# Patient Record
Sex: FEMALE | Race: WHITE | NOT HISPANIC OR LATINO | Employment: PART TIME | URBAN - METROPOLITAN AREA
[De-identification: names, ages, dates, MRNs, and addresses within clinical notes are randomized per-mention and may not be internally consistent; named-entity substitution may affect disease eponyms.]

---

## 2017-02-10 RX ORDER — CITALOPRAM 40 MG/1
40 TABLET, FILM COATED ORAL DAILY
Qty: 30 TABLET | Refills: 3 | Status: SHIPPED | OUTPATIENT
Start: 2017-02-10 | End: 2017-06-14 | Stop reason: SDUPTHER

## 2017-06-14 RX ORDER — CITALOPRAM 40 MG/1
40 TABLET, FILM COATED ORAL DAILY
Qty: 30 TABLET | Refills: 3 | Status: SHIPPED | OUTPATIENT
Start: 2017-06-14 | End: 2017-10-13 | Stop reason: SDUPTHER

## 2017-10-13 RX ORDER — CITALOPRAM 40 MG/1
40 TABLET, FILM COATED ORAL DAILY
Qty: 30 TABLET | Refills: 0 | Status: SHIPPED | OUTPATIENT
Start: 2017-10-13 | End: 2017-10-25 | Stop reason: SDUPTHER

## 2017-10-24 ENCOUNTER — DOCUMENTATION ONLY (OUTPATIENT)
Dept: FAMILY MEDICINE | Facility: CLINIC | Age: 57
End: 2017-10-24

## 2017-10-24 NOTE — PROGRESS NOTES
Pre-Visit Chart Review  For Appointment Scheduled on 10/25/17.    Health Maintenance Due   Topic Date Due    Mammogram  05/15/2017

## 2017-10-25 ENCOUNTER — OFFICE VISIT (OUTPATIENT)
Dept: FAMILY MEDICINE | Facility: CLINIC | Age: 57
End: 2017-10-25
Payer: COMMERCIAL

## 2017-10-25 VITALS
DIASTOLIC BLOOD PRESSURE: 83 MMHG | HEIGHT: 63 IN | TEMPERATURE: 98 F | HEART RATE: 75 BPM | WEIGHT: 142.44 LBS | SYSTOLIC BLOOD PRESSURE: 130 MMHG | BODY MASS INDEX: 25.24 KG/M2

## 2017-10-25 DIAGNOSIS — Z23 IMMUNIZATION DUE: ICD-10-CM

## 2017-10-25 DIAGNOSIS — Z00.00 ROUTINE MEDICAL EXAM: Primary | ICD-10-CM

## 2017-10-25 PROCEDURE — 90686 IIV4 VACC NO PRSV 0.5 ML IM: CPT | Mod: S$GLB,,, | Performed by: FAMILY MEDICINE

## 2017-10-25 PROCEDURE — 90472 IMMUNIZATION ADMIN EACH ADD: CPT | Mod: S$GLB,,, | Performed by: FAMILY MEDICINE

## 2017-10-25 PROCEDURE — 99999 PR PBB SHADOW E&M-EST. PATIENT-LVL III: CPT | Mod: PBBFAC,,, | Performed by: FAMILY MEDICINE

## 2017-10-25 PROCEDURE — 90471 IMMUNIZATION ADMIN: CPT | Mod: S$GLB,,, | Performed by: FAMILY MEDICINE

## 2017-10-25 PROCEDURE — 99396 PREV VISIT EST AGE 40-64: CPT | Mod: 25,S$GLB,, | Performed by: FAMILY MEDICINE

## 2017-10-25 PROCEDURE — 90715 TDAP VACCINE 7 YRS/> IM: CPT | Mod: S$GLB,,, | Performed by: FAMILY MEDICINE

## 2017-10-25 RX ORDER — CITALOPRAM 40 MG/1
40 TABLET, FILM COATED ORAL DAILY
Qty: 90 TABLET | Refills: 3 | Status: SHIPPED | OUTPATIENT
Start: 2017-10-25 | End: 2018-10-30 | Stop reason: SDUPTHER

## 2017-10-25 NOTE — PROGRESS NOTES
CHIEF COMPLAINT:  Routine medical exam      HISTORY OF PRESENT ILLNESS:  Karen Muhammad is a 57 y.o. female who presents to clinic for a routine medical exam. She has no specific concerns. She follows up with Dr. Clark and states that she had a normal pap smear and mammogram. She is up to date on her colonoscopy. She follows up with a dentist, optometrist regularly. She walks for exercise, she does not follow a specific diet. She states that she had lab work done with Dr. Beaulieu 6 months ago.       REVIEW OF SYSTEMS:  The patient denies any fever, chills, night sweats, headaches, vision changes, difficulty speaking or swallowing, decreased hearing, weight loss, weight gain, chest pain, palpitations, shortness of breath, cough, nausea, vomiting, abdominal pain, dysuria, diarrhea, constipation, hematuria, hematochezia, melena, changes in her hair, skin, nails, numbness or weakness in her extremities, erythema, pain or swelling over any of her joints, myalgia, swollen glands, easy bruising, fatigue, edema, symptoms of anxiety or depression. She denies any vaginal discharge, breast masses, nipple discharge, change in the skin overlying her breasts.      MEDICATIONS:   Reviewed and/or reconciled in EPIC    ALLERGIES:  Reviewed and/or reconciled in [place] [facility]    PAST MEDICAL/SURGICAL HISTORY:   Past Medical History:   Diagnosis Date    Anticoagulant long-term use     Anxiety     Depression     Hyperlipidemia     Migraine     Thyroid disease     hashimoto's thyroiditis with pregnancy      Past Surgical History:   Procedure Laterality Date    EYE SURGERY  1990    pterygium removed    MOLE REMOVAL  1997    premalignant    WISDOM TOOTH EXTRACTION         FAMILY HISTORY:    Family History   Problem Relation Age of Onset    Nephrolithiasis Father     Heart disease Father 69    Heart disease Paternal Uncle     Nephrolithiasis Paternal Grandfather     Heart disease Paternal Grandfather 70    Nephrolithiasis  "Other      niece    Cancer Neg Hx        SOCIAL HISTORY:    Social History     Social History    Marital status:      Spouse name: N/A    Number of children: N/A    Years of education: N/A     Occupational History    Not on file.     Social History Main Topics    Smoking status: Never Smoker    Smokeless tobacco: Never Used    Alcohol use Yes      Comment: occasional    Drug use: No    Sexual activity: Yes     Partners: Male     Other Topics Concern    Not on file     Social History Narrative    No narrative on file       PHYSICAL EXAM:  VITAL SIGNS:   Vitals:    10/25/17 0826   BP: 130/83   BP Location: Right arm   Patient Position: Sitting   BP Method: Small (Automatic)   Pulse: 75   Temp: 98 °F (36.7 °C)   TempSrc: Oral   Weight: 64.6 kg (142 lb 6.7 oz)   Height: 5' 3" (1.6 m)     GENERAL:  Patient appears well nourished, sitting on exam table, in no acute distress.  HEENT:  Atraumatic, normocephalic, PERRLA, EOMI, no conjunctival injection, sclerae are anicteric, normal external auditory canals,TMs clear b/l, gross hearing intact to whisper, MMM, no oropharygneal erythema or exudate.  NECK:  Supple, normal ROM, trachea is midline , no supraclavicular or cervical LAD or masses palpated.  Thyroid gland not palpable.  CARDIOVASCULAR:  RRR, normal S1 and S2, no m/r/g.  RESPIRATORY:  CTA b/l, no wheezes, rhonchi, rales.  No increased work of breathing, no  use of accessory muscles.  ABDOMEN:  Soft, nontender, nondistended, normoactive bowel sounds in all four quadrants, no rebound or guarding, no HSM or masses palpated.  Normal percussion.  EXTREMITIES:  2+ DP pulses b/l, no edema.  SKIN:  Warm, no lesions on exposed skin.  NEUROMUSCULAR:  Cranial nerves II-XII grossly intact.  , 2+ biceps and patellar reflexes b/l. No clubbing or cyanosis of digits/nails.  Steady gait.  PSYCH:  Patient is alert and oriented to person, time, place. They are appropriately dressed and groomed. There is normal eye " contact. Rate and tone of speech is normal. Normal insight, judgement. Normal thought content and process.     LABORATORY/IMAGING STUDIES: pending  ASSESSMENT/PLAN: This is a 57 y.o. female who presents to clinic for a routine medical exam    1. Routine medical exam: We will check a screening CBC, CMP, TSH, fasting lipid panel. Patient will be notified of these results and the need for any further evaluation and treatment. She will follow up with her OB/GYN for her annual exam. She will follow up with her dentist/optometrist as scheduled. We discussed the importance of increasing her exercise, continuing with a healthy, well-balanced diet.   2. Immunization due: she will receive an influenza vaccine and adacel in clinic today      FOLLOW UP:  6 months      Marlin Estrada MD

## 2017-10-30 ENCOUNTER — TELEPHONE (OUTPATIENT)
Dept: FAMILY MEDICINE | Facility: CLINIC | Age: 57
End: 2017-10-30

## 2017-10-30 NOTE — TELEPHONE ENCOUNTER
Please schedule [facility] labs. Last lab work done by Dr. Beaulieu was only a TSH.

## 2017-11-04 ENCOUNTER — LAB VISIT (OUTPATIENT)
Dept: LAB | Facility: HOSPITAL | Age: 57
End: 2017-11-04
Attending: FAMILY MEDICINE
Payer: COMMERCIAL

## 2017-11-04 DIAGNOSIS — Z00.00 ROUTINE MEDICAL EXAM: ICD-10-CM

## 2017-11-04 LAB
ALBUMIN SERPL BCP-MCNC: 3.8 G/DL
ALP SERPL-CCNC: 76 U/L
ALT SERPL W/O P-5'-P-CCNC: 27 U/L
ANION GAP SERPL CALC-SCNC: 9 MMOL/L
AST SERPL-CCNC: 26 U/L
BASOPHILS # BLD AUTO: 0.04 K/UL
BASOPHILS NFR BLD: 0.8 %
BILIRUB SERPL-MCNC: 0.6 MG/DL
BUN SERPL-MCNC: 12 MG/DL
CALCIUM SERPL-MCNC: 10 MG/DL
CHLORIDE SERPL-SCNC: 106 MMOL/L
CHOLEST SERPL-MCNC: 159 MG/DL
CHOLEST/HDLC SERPL: 2.7 {RATIO}
CO2 SERPL-SCNC: 27 MMOL/L
CREAT SERPL-MCNC: 0.8 MG/DL
DIFFERENTIAL METHOD: NORMAL
EOSINOPHIL # BLD AUTO: 0.2 K/UL
EOSINOPHIL NFR BLD: 3.9 %
ERYTHROCYTE [DISTWIDTH] IN BLOOD BY AUTOMATED COUNT: 12.6 %
EST. GFR  (AFRICAN AMERICAN): >60 ML/MIN/1.73 M^2
EST. GFR  (NON AFRICAN AMERICAN): >60 ML/MIN/1.73 M^2
GLUCOSE SERPL-MCNC: 92 MG/DL
HCT VFR BLD AUTO: 45.7 %
HDLC SERPL-MCNC: 60 MG/DL
HDLC SERPL: 37.7 %
HGB BLD-MCNC: 14.8 G/DL
IMM GRANULOCYTES # BLD AUTO: 0.01 K/UL
IMM GRANULOCYTES NFR BLD AUTO: 0.2 %
LDLC SERPL CALC-MCNC: 81 MG/DL
LYMPHOCYTES # BLD AUTO: 2 K/UL
LYMPHOCYTES NFR BLD: 40.1 %
MCH RBC QN AUTO: 29.5 PG
MCHC RBC AUTO-ENTMCNC: 32.4 G/DL
MCV RBC AUTO: 91 FL
MONOCYTES # BLD AUTO: 0.5 K/UL
MONOCYTES NFR BLD: 11.1 %
NEUTROPHILS # BLD AUTO: 2.1 K/UL
NEUTROPHILS NFR BLD: 43.9 %
NONHDLC SERPL-MCNC: 99 MG/DL
NRBC BLD-RTO: 0 /100 WBC
PLATELET # BLD AUTO: 177 K/UL
PMV BLD AUTO: 11.9 FL
POTASSIUM SERPL-SCNC: 4.4 MMOL/L
PROT SERPL-MCNC: 7.6 G/DL
RBC # BLD AUTO: 5.02 M/UL
SODIUM SERPL-SCNC: 142 MMOL/L
T4 FREE SERPL-MCNC: 1.28 NG/DL
TRIGL SERPL-MCNC: 90 MG/DL
TSH SERPL DL<=0.005 MIU/L-ACNC: 0.09 UIU/ML
WBC # BLD AUTO: 4.86 K/UL

## 2017-11-04 PROCEDURE — 84443 ASSAY THYROID STIM HORMONE: CPT

## 2017-11-04 PROCEDURE — 84439 ASSAY OF FREE THYROXINE: CPT

## 2017-11-04 PROCEDURE — 36415 COLL VENOUS BLD VENIPUNCTURE: CPT | Mod: PO

## 2017-11-04 PROCEDURE — 85025 COMPLETE CBC W/AUTO DIFF WBC: CPT

## 2017-11-04 PROCEDURE — 80061 LIPID PANEL: CPT

## 2017-11-04 PROCEDURE — 80053 COMPREHEN METABOLIC PANEL: CPT

## 2017-11-06 ENCOUNTER — TELEPHONE (OUTPATIENT)
Dept: FAMILY MEDICINE | Facility: CLINIC | Age: 57
End: 2017-11-06

## 2017-11-06 DIAGNOSIS — E03.9 HYPOTHYROIDISM, UNSPECIFIED TYPE: Primary | ICD-10-CM

## 2017-11-07 NOTE — TELEPHONE ENCOUNTER
Lab work is normal except for low TSH, needs to have repeat TSh in 2-3 weeks. If still low will need to decrease her synthroid dose.

## 2017-11-21 ENCOUNTER — LAB VISIT (OUTPATIENT)
Dept: LAB | Facility: HOSPITAL | Age: 57
End: 2017-11-21
Attending: FAMILY MEDICINE
Payer: COMMERCIAL

## 2017-11-21 DIAGNOSIS — E03.9 HYPOTHYROIDISM, UNSPECIFIED TYPE: ICD-10-CM

## 2017-11-21 LAB
T4 FREE SERPL-MCNC: 1.38 NG/DL
TSH SERPL DL<=0.005 MIU/L-ACNC: 0.09 UIU/ML

## 2017-11-21 PROCEDURE — 84443 ASSAY THYROID STIM HORMONE: CPT

## 2017-11-21 PROCEDURE — 36415 COLL VENOUS BLD VENIPUNCTURE: CPT | Mod: PO

## 2017-11-21 PROCEDURE — 84439 ASSAY OF FREE THYROXINE: CPT

## 2017-11-22 ENCOUNTER — TELEPHONE (OUTPATIENT)
Dept: FAMILY MEDICINE | Facility: CLINIC | Age: 57
End: 2017-11-22

## 2017-11-22 NOTE — TELEPHONE ENCOUNTER
Patient notified and states understanding.  Labs faxed to dr Beaulieu office via [employer]

## 2017-11-22 NOTE — TELEPHONE ENCOUNTER
----- Message from Tiffanie Duffy sent at 11/22/2017 11:38 AM CST -----  Patient is returning Ailyn's call concerning her lab results. Please call back at [phone].

## 2018-02-16 ENCOUNTER — TELEPHONE (OUTPATIENT)
Dept: FAMILY MEDICINE | Facility: CLINIC | Age: 58
End: 2018-02-16

## 2018-02-16 NOTE — TELEPHONE ENCOUNTER
Left message on machine for pt to call back. We need to reschedule appt with Dr. Estrada on 4/27/18. Dr. Estrada is not in clinic that day. Thanks, Katie

## 2018-05-09 ENCOUNTER — DOCUMENTATION ONLY (OUTPATIENT)
Dept: FAMILY MEDICINE | Facility: CLINIC | Age: 58
End: 2018-05-09

## 2018-05-09 NOTE — PROGRESS NOTES
Pre-Visit Chart Review  For Appointment Scheduled on 5/21/18.    Health Maintenance Due   Topic Date Due    Mammogram  05/05/2002    Pap Smear with HPV Cotest  05/15/2018

## 2018-05-14 DIAGNOSIS — Z12.39 SCREENING FOR BREAST CANCER: Primary | ICD-10-CM

## 2018-05-21 ENCOUNTER — OFFICE VISIT (OUTPATIENT)
Dept: FAMILY MEDICINE | Facility: CLINIC | Age: 58
End: 2018-05-21
Payer: COMMERCIAL

## 2018-05-21 ENCOUNTER — HOSPITAL ENCOUNTER (OUTPATIENT)
Dept: RADIOLOGY | Facility: CLINIC | Age: 58
Discharge: HOME | End: 2018-05-21
Attending: FAMILY MEDICINE
Payer: COMMERCIAL

## 2018-05-21 VITALS
HEIGHT: 63 IN | HEART RATE: 67 BPM | WEIGHT: 139.13 LBS | DIASTOLIC BLOOD PRESSURE: 78 MMHG | SYSTOLIC BLOOD PRESSURE: 139 MMHG | BODY MASS INDEX: 24.65 KG/M2 | TEMPERATURE: 98 F

## 2018-05-21 DIAGNOSIS — M54.50 CHRONIC LEFT-SIDED LOW BACK PAIN WITHOUT SCIATICA: ICD-10-CM

## 2018-05-21 DIAGNOSIS — E03.9 HYPOTHYROIDISM, UNSPECIFIED TYPE: ICD-10-CM

## 2018-05-21 DIAGNOSIS — M79.604 PAIN IN BOTH LOWER EXTREMITIES: ICD-10-CM

## 2018-05-21 DIAGNOSIS — M79.605 PAIN IN BOTH LOWER EXTREMITIES: ICD-10-CM

## 2018-05-21 DIAGNOSIS — G89.29 CHRONIC LEFT-SIDED LOW BACK PAIN WITHOUT SCIATICA: ICD-10-CM

## 2018-05-21 DIAGNOSIS — F41.9 ANXIETY: ICD-10-CM

## 2018-05-21 DIAGNOSIS — E78.5 HYPERLIPIDEMIA, UNSPECIFIED HYPERLIPIDEMIA TYPE: Primary | ICD-10-CM

## 2018-05-21 DIAGNOSIS — F32.A DEPRESSION, UNSPECIFIED DEPRESSION TYPE: ICD-10-CM

## 2018-05-21 PROCEDURE — 72110 X-RAY EXAM L-2 SPINE 4/>VWS: CPT | Mod: 26,,, | Performed by: RADIOLOGY

## 2018-05-21 PROCEDURE — 99999 PR PBB SHADOW E&M-EST. PATIENT-LVL III: CPT | Mod: PBBFAC,,, | Performed by: FAMILY MEDICINE

## 2018-05-21 PROCEDURE — 99214 OFFICE O/P EST MOD 30 MIN: CPT | Mod: S$GLB,,, | Performed by: FAMILY MEDICINE

## 2018-05-21 PROCEDURE — 72110 X-RAY EXAM L-2 SPINE 4/>VWS: CPT | Mod: TC,FY,PO

## 2018-05-21 RX ORDER — LEVOTHYROXINE SODIUM 88 UG/1
88 TABLET ORAL DAILY
COMMUNITY

## 2018-05-21 NOTE — PROGRESS NOTES
CHIEF COMPLAINT: follow up hyperlipidemia, anxiety, depression      HISTORY OF PRESENT ILLNESS:  Karen Muhammad is a 58 y.o. female patient who presents to clinic for follow up on on her chronic medical conditions.    1. Hashimoto's thyroiditis:   Patient is on synthroid 88 mcg daily. She is established with Dr. Beaulieu, endocrinology and will be following up with her in 3 months.    2. Hyperlipidemia: Patient is on lipitor, ASA. She is established with cardiology due to work up of chest pain, family history of heart disease. She denies any myalgia, dark colored urine. She is due for lab work.     3. Anxiety,depression: Patient is on celexa 40 mg daily. She feels that this adequately controls her symptoms. She denies any side effects.    4. She has had several month history of intermittent pain over her left lower back, this is worse with prolonged walking. She also describes several week history of pain over her b/l legs which is worse at night. She denies any injury. The pain is improved with tylenol.       REVIEW OF SYSTEMS:  The patient denies any fever, chills, night sweats, headaches, vision changes, difficulty speaking or swallowing, decreased hearing, weight loss, weight gain, chest pain, palpitations, shortness of breath, cough, nausea, vomiting, abdominal pain, dysuria, diarrhea, constipation, hematuria, hematochezia, melena, changes in her hair, skin, nails, numbness or weakness in her extremities, erythema, swelling over any of her joints, myalgias, swollen glands, easy bruising, fatigue, edema, symptoms of anxiety or depression. She denies any vaginal discharge, breast masses, nipple discharge, change in the skin overlying her breasts.      MEDICATIONS:   Reviewed and/or reconciled in EPIC    ALLERGIES:  Reviewed and/or reconciled in [facility]    PAST MEDICAL/SURGICAL HISTORY:   Past Medical History:   Diagnosis Date    Anticoagulant long-term use     Anxiety     Depression     Hyperlipidemia      "Migraine     Thyroid disease     hashimoto's thyroiditis with pregnancy      Past Surgical History:   Procedure Laterality Date    EYE SURGERY  1990    pterygium removed    MOLE REMOVAL  1997    premalignant    WISDOM TOOTH EXTRACTION         FAMILY HISTORY:    Family History   Problem Relation Age of Onset    Nephrolithiasis Father     Heart disease Father 69    Heart disease Paternal Uncle     Nephrolithiasis Paternal Grandfather     Heart disease Paternal Grandfather 70    Nephrolithiasis Other         niece    Cancer Neg Hx        SOCIAL HISTORY:    Social History     Social History    Marital status:      Spouse name: N/A    Number of children: N/A    Years of education: N/A     Occupational History    Not on file.     Social History Main Topics    Smoking status: Never Smoker    Smokeless tobacco: Never Used    Alcohol use Yes      Comment: occasional    Drug use: No    Sexual activity: Yes     Partners: Male     Other Topics Concern    Not on file     Social History Narrative    No narrative on file       PHYSICAL EXAM:  VITAL SIGNS:   Vitals:    05/21/18 0801   BP: 139/78   BP Location: Left arm   Patient Position: Sitting   BP Method: Medium (Automatic)   Pulse: 67   Temp: 98.3 °F (36.8 °C)   TempSrc: Oral   Weight: 63.1 kg (139 lb 1.8 oz)   Height: 5' 3" (1.6 m)     GENERAL:  Patient appears well nourished, sitting on exam table, in no acute distress.  HEENT:  Atraumatic, normocephalic, PERRLA, EOMI, no conjunctival injection, sclerae are anicteric, normal external auditory canals,TMs clear b/l, gross hearing intact to whisper, MMM, no oropharygneal erythema or exudate.  NECK:  Supple, normal ROM, trachea is midline , no supraclavicular or cervical LAD or masses palpated.  Thyroid gland not palpable.  CARDIOVASCULAR:  RRR, normal S1 and S2, no m/r/g.  RESPIRATORY:  CTA b/l, no wheezes, rhonchi, rales.  No increased work of breathing, no  use of accessory muscles.  ABDOMEN:  " Soft, nontender, nondistended, normoactive bowel sounds in all four quadrants, no rebound or guarding, no HSM or masses palpated.  Normal percussion.  EXTREMITIES:  2+ DP pulses b/l, no edema.  SKIN:  Warm, no lesions on exposed skin.  NEUROMUSCULAR:  No tenderness over cervical, thoracic, lumbar spinous processes, paraspinal muscles, SI joints. Normal ROM over cervical and lumbar spines.  4+/5 hip, knee, ankle flexors/extensors  b/l.  2+ patellar and achilles reflexes b/l.  Steady gait.  Cranial nerves II-XII grossly intact.  No clubbing or cyanosis of digits/nails.      PSYCH:  Patient is alert and oriented to person, time, place.  Patient is appropriately dressed and groomed. There is normal eye contact. Rate and tone of speech is normal. Normal thought content and process. normal insight and judgement. Patient describes her mood as good, affect is euthymic.         ASSESSMENT/PLAN: This is a 58 y.o. female who presents to clinic  For evaluation of the following concerns  1. Hyperlipidemia, unspecified hyperlipidemia type  - Comprehensive metabolic panel; Future  - Lipid panel; Future    2. Depression, unspecified depression type, anxiety  Continue with current dose of celexa    3. Hypothyroidism, unspecified type  Follow up with endocrinology    5. Chronic left-sided low back pain without sciatica  - X-Ray Lumbar Spine Complete 5 View; Future  -consider referral to PT or back and spine center    6. Pain in both lower extremities  - X-Ray Lumbar Spine Complete 5 View; Future  -consider referral to PT      Marlin Estrada MD

## 2018-05-28 ENCOUNTER — TELEPHONE (OUTPATIENT)
Dept: FAMILY MEDICINE | Facility: CLINIC | Age: 58
End: 2018-05-28

## 2018-05-28 NOTE — TELEPHONE ENCOUNTER
----- Message from Marlin Estrada MD sent at 5/28/2018  3:29 PM CDT -----  Xray of lumbar spine demonstrated degenerative changes. Let me know if she wants to go to PT or the back and spine center.

## 2018-06-06 ENCOUNTER — TELEPHONE (OUTPATIENT)
Dept: FAMILY MEDICINE | Facility: CLINIC | Age: 58
End: 2018-06-06

## 2018-06-06 ENCOUNTER — PATIENT MESSAGE (OUTPATIENT)
Dept: FAMILY MEDICINE | Facility: CLINIC | Age: 58
End: 2018-06-06

## 2018-08-04 ENCOUNTER — TELEPHONE (OUTPATIENT)
Dept: FAMILY MEDICINE | Facility: CLINIC | Age: 58
End: 2018-08-04

## 2018-08-04 DIAGNOSIS — E83.52 HYPERCALCEMIA: Primary | ICD-10-CM

## 2018-08-04 NOTE — TELEPHONE ENCOUNTER
Lab work at goal except for slightly elelvated calcium level. Needs repeat cmp and ionized calcium.

## 2018-08-08 ENCOUNTER — LAB VISIT (OUTPATIENT)
Dept: LAB | Facility: HOSPITAL | Age: 58
End: 2018-08-08
Attending: FAMILY MEDICINE
Payer: COMMERCIAL

## 2018-08-08 DIAGNOSIS — E83.52 HYPERCALCEMIA: ICD-10-CM

## 2018-08-08 LAB
ALBUMIN SERPL BCP-MCNC: 4.4 G/DL
ALP SERPL-CCNC: 81 U/L
ALT SERPL W/O P-5'-P-CCNC: 35 U/L
ANION GAP SERPL CALC-SCNC: 9 MMOL/L
AST SERPL-CCNC: 26 U/L
BILIRUB SERPL-MCNC: 0.9 MG/DL
BUN SERPL-MCNC: 13 MG/DL
CA-I BLDV-SCNC: 1.31 MMOL/L
CALCIUM SERPL-MCNC: 10.7 MG/DL
CHLORIDE SERPL-SCNC: 104 MMOL/L
CO2 SERPL-SCNC: 28 MMOL/L
CREAT SERPL-MCNC: 0.8 MG/DL
EST. GFR  (AFRICAN AMERICAN): >60 ML/MIN/1.73 M^2
EST. GFR  (NON AFRICAN AMERICAN): >60 ML/MIN/1.73 M^2
GLUCOSE SERPL-MCNC: 91 MG/DL
POTASSIUM SERPL-SCNC: 4.4 MMOL/L
PROT SERPL-MCNC: 8 G/DL
SODIUM SERPL-SCNC: 141 MMOL/L

## 2018-08-08 PROCEDURE — 36415 COLL VENOUS BLD VENIPUNCTURE: CPT | Mod: PO

## 2018-08-08 PROCEDURE — 82330 ASSAY OF CALCIUM: CPT

## 2018-08-08 PROCEDURE — 80053 COMPREHEN METABOLIC PANEL: CPT

## 2018-10-30 RX ORDER — CITALOPRAM 40 MG/1
TABLET, FILM COATED ORAL
Qty: 30 TABLET | Refills: 9 | Status: SHIPPED | OUTPATIENT
Start: 2018-10-30 | End: 2019-07-29 | Stop reason: SDUPTHER

## 2018-11-07 ENCOUNTER — TELEPHONE (OUTPATIENT)
Dept: ADMINISTRATIVE | Facility: HOSPITAL | Age: 58
End: 2018-11-07

## 2018-11-21 ENCOUNTER — DOCUMENTATION ONLY (OUTPATIENT)
Dept: FAMILY MEDICINE | Facility: CLINIC | Age: 58
End: 2018-11-21

## 2018-11-21 ENCOUNTER — OFFICE VISIT (OUTPATIENT)
Dept: FAMILY MEDICINE | Facility: CLINIC | Age: 58
End: 2018-11-21
Payer: COMMERCIAL

## 2018-11-21 VITALS
TEMPERATURE: 98 F | HEIGHT: 63 IN | SYSTOLIC BLOOD PRESSURE: 124 MMHG | HEART RATE: 80 BPM | BODY MASS INDEX: 25.78 KG/M2 | WEIGHT: 145.5 LBS | DIASTOLIC BLOOD PRESSURE: 79 MMHG

## 2018-11-21 DIAGNOSIS — M54.50 ACUTE LEFT-SIDED LOW BACK PAIN WITHOUT SCIATICA: Primary | ICD-10-CM

## 2018-11-21 DIAGNOSIS — B02.8 HERPES ZOSTER WITH COMPLICATION: ICD-10-CM

## 2018-11-21 PROCEDURE — 99999 PR PBB SHADOW E&M-EST. PATIENT-LVL III: CPT | Mod: PBBFAC,,, | Performed by: PHYSICIAN ASSISTANT

## 2018-11-21 PROCEDURE — 99214 OFFICE O/P EST MOD 30 MIN: CPT | Mod: S$GLB,,, | Performed by: PHYSICIAN ASSISTANT

## 2018-11-21 RX ORDER — METHYLPREDNISOLONE 4 MG/1
TABLET ORAL
Qty: 1 PACKAGE | Refills: 0 | Status: SHIPPED | OUTPATIENT
Start: 2018-11-21 | End: 2018-11-21 | Stop reason: SDUPTHER

## 2018-11-21 RX ORDER — VALACYCLOVIR HYDROCHLORIDE 1 G/1
1000 TABLET, FILM COATED ORAL 3 TIMES DAILY
COMMUNITY
End: 2018-12-05 | Stop reason: ALTCHOICE

## 2018-11-21 RX ORDER — METHYLPREDNISOLONE 4 MG/1
TABLET ORAL
Qty: 1 PACKAGE | Refills: 0 | Status: SHIPPED | OUTPATIENT
Start: 2018-11-21 | End: 2018-12-05 | Stop reason: ALTCHOICE

## 2018-11-21 RX ORDER — HYDROCODONE BITARTRATE AND ACETAMINOPHEN 5; 325 MG/1; MG/1
1 TABLET ORAL EVERY 6 HOURS PRN
COMMUNITY
End: 2019-06-03

## 2018-11-21 NOTE — PROGRESS NOTES
Pre-Visit Chart Review  For Appointment Scheduled on 11/21/18    Health Maintenance Due   Topic Date Due    Influenza Vaccine  08/01/2018

## 2018-11-21 NOTE — PROGRESS NOTES
Subjective:       Patient ID: Karen Muhammad is a 58 y.o. female.    Chief Complaint: Back Pain and Rash    HPI   Patient is a 58 year old  female presenting to the clinic for follow-up left sided low back pain radiating down left posterior and anterior thigh. Patient reports pain worsens to 9/10 at worst with laying down. She takes tylenol without any relief. She was seen at [facility] 11/19/19 and reports she had xrays performed which were negative. She was diagnosed with shingles rash.   Review of Systems   Constitutional: Negative for activity change, appetite change, chills, diaphoresis, fatigue and fever.   HENT: Negative for congestion, postnasal drip and rhinorrhea.    Respiratory: Negative.  Negative for cough, shortness of breath and wheezing.    Cardiovascular: Negative.  Negative for chest pain.   Gastrointestinal: Negative for abdominal pain, blood in stool, constipation, diarrhea, nausea and vomiting.   Genitourinary: Negative for dysuria, frequency, hematuria and urgency.   Musculoskeletal: Positive for back pain. Negative for gait problem, joint swelling, myalgias and neck pain.   Skin: Positive for color change and rash. Negative for pallor and wound.   Neurological: Negative for dizziness and syncope.   Psychiatric/Behavioral: Negative for agitation, behavioral problems and confusion.       Objective:      Physical Exam   Constitutional: Vital signs are normal. She appears well-developed and well-nourished. No distress.   Cardiovascular: Normal rate, regular rhythm, S1 normal, S2 normal and normal heart sounds. Exam reveals no gallop.   No murmur heard.  Pulses:       Radial pulses are 2+ on the right side, and 2+ on the left side.   <2sec cap refill fingers bilat     Pulmonary/Chest: Effort normal and breath sounds normal. No respiratory distress. She has no wheezes. She has no rhonchi.   Musculoskeletal:        Lumbar back: She exhibits tenderness and pain. She exhibits normal range of  motion, no bony tenderness, no spasm and normal pulse.   Skin: Skin is warm and dry. She is not diaphoretic.   Moderately erythematic macular papular rash not crossing left side of lower back and left anterior thigh following at L2 dermatome distribution   Psychiatric: She has a normal mood and affect. Her speech is normal and behavior is normal. Judgment and thought content normal. Cognition and memory are normal.       Assessment:       1. Acute left-sided low back pain without sciatica    2. Herpes zoster with complication        Plan:       Karen STEVENSON was seen today for back pain and rash.    Diagnoses and all orders for this visit:    Acute left-sided low back pain without sciatica  S/p ER visit at [facility]; patient reports negative xrays  I suspect most likely 2/2 shingles rash, but if pain persists after resolution of rash, may consider MRI  Can use Norco 5-325mg every 6 hours severe pain  -     methylPREDNISolone (MEDROL DOSEPACK) 4 mg tablet; use as directed    Herpes zoster with complication  Complete valtrex as directed  -     methylPREDNISolone (MEDROL DOSEPACK) 4 mg tablet; use as directed  Can use Norco 5-325mg every 6 hours severe pain

## 2018-11-28 ENCOUNTER — DOCUMENTATION ONLY (OUTPATIENT)
Dept: FAMILY MEDICINE | Facility: CLINIC | Age: 58
End: 2018-11-28

## 2018-11-28 NOTE — PROGRESS NOTES
Pre-Visit Chart Review  For Appointment Scheduled on 12/5/2018    Health Maintenance Due   Topic Date Due    Influenza Vaccine  08/01/2018

## 2018-12-05 ENCOUNTER — OFFICE VISIT (OUTPATIENT)
Dept: FAMILY MEDICINE | Facility: CLINIC | Age: 58
End: 2018-12-05
Payer: COMMERCIAL

## 2018-12-05 VITALS
TEMPERATURE: 99 F | HEART RATE: 76 BPM | HEIGHT: 63 IN | DIASTOLIC BLOOD PRESSURE: 75 MMHG | BODY MASS INDEX: 25.01 KG/M2 | WEIGHT: 141.13 LBS | SYSTOLIC BLOOD PRESSURE: 116 MMHG

## 2018-12-05 DIAGNOSIS — B02.29 POST HERPETIC NEURALGIA: ICD-10-CM

## 2018-12-05 DIAGNOSIS — E83.52 HYPERCALCEMIA: ICD-10-CM

## 2018-12-05 DIAGNOSIS — E78.5 HYPERLIPIDEMIA, UNSPECIFIED HYPERLIPIDEMIA TYPE: Primary | ICD-10-CM

## 2018-12-05 DIAGNOSIS — E06.3 HASHIMOTO'S THYROIDITIS: ICD-10-CM

## 2018-12-05 DIAGNOSIS — Z23 IMMUNIZATION DUE: ICD-10-CM

## 2018-12-05 PROCEDURE — 99214 OFFICE O/P EST MOD 30 MIN: CPT | Mod: 25,S$GLB,, | Performed by: FAMILY MEDICINE

## 2018-12-05 PROCEDURE — 90471 IMMUNIZATION ADMIN: CPT | Mod: S$GLB,,, | Performed by: FAMILY MEDICINE

## 2018-12-05 PROCEDURE — 99999 PR PBB SHADOW E&M-EST. PATIENT-LVL IV: CPT | Mod: PBBFAC,,, | Performed by: FAMILY MEDICINE

## 2018-12-05 PROCEDURE — 90686 IIV4 VACC NO PRSV 0.5 ML IM: CPT | Mod: S$GLB,,, | Performed by: FAMILY MEDICINE

## 2018-12-05 RX ORDER — GABAPENTIN 300 MG/1
CAPSULE ORAL
Qty: 90 CAPSULE | Refills: 6 | Status: SHIPPED | OUTPATIENT
Start: 2018-12-05 | End: 2019-06-03

## 2018-12-05 NOTE — PROGRESS NOTES
2 patient identifiers used ( name &  ).  Administered Flu IM Right deltoid.  Patient tolerated well, no bleeding at insertion site noted.  Pain scale 0/10.  Aseptic technique maintained. Advised patient to remain in clinic for 15 minutes to monitor for reaction.  No AR noted.

## 2018-12-05 NOTE — PATIENT INSTRUCTIONS
Call or message clinic in 2-3 weeks to give update on gabapentin and back pain, sooner if needed.

## 2018-12-05 NOTE — PROGRESS NOTES
CHIEF COMPLAINT: follow up hyperlipidemia, hypothyroidism.      HISTORY OF PRESENT ILLNESS:  Karen Muhammad is a 58 y.o. female patient who presents to clinic for follow up on on her chronic medical conditions.    1. Hashimoto's thyroiditis:   Patient is on synthroid 88 mcg daily. She is established with Dr. Beaulieu, endocrinology and states that she had labs several months ago.    2. Hyperlipidemia: Patient is on lipitor, ASA. She is established with cardiology due to work up of chest pain, family history of heart disease. She denies any myalgia, dark colored urine. She is due for lab work.     3. She has had slightly elevated calcium level in the past, ionized calcium was normal.    4. She continues to have some pain over her left L2-4 dermatomes after having herpes zoster.         REVIEW OF SYSTEMS:  The patient denies any fever, chills, night sweats, headaches, vision changes, difficulty speaking or swallowing, decreased hearing, weight loss, weight gain, chest pain, palpitations, shortness of breath, cough, nausea, vomiting, abdominal pain, dysuria, diarrhea, constipation, hematuria, hematochezia, melena, changes in her hair, skin, nails, numbness or weakness in her extremities, erythema, swelling over any of her joints, myalgias, swollen glands, easy bruising, fatigue, edema, symptoms of anxiety or depression. She denies any vaginal discharge, breast masses, nipple discharge, change in the skin overlying her breasts.      MEDICATIONS:   Reviewed and/or reconciled in EPIC    ALLERGIES:  Reviewed and/or reconciled in [facility]    PAST MEDICAL/SURGICAL HISTORY:   Past Medical History:   Diagnosis Date    Anticoagulant long-term use     Anxiety     Depression     Hyperlipidemia     Migraine     Thyroid disease     hashimoto's thyroiditis with pregnancy      Past Surgical History:   Procedure Laterality Date    COLONOSCOPY N/A 7/29/2015    [facility]    EYE SURGERY  1990  "   pterygium removed    MOLE REMOVAL  1997    premalignant    WISDOM TOOTH EXTRACTION         FAMILY HISTORY:    Family History   Problem Relation Age of Onset    Nephrolithiasis Father     Heart disease Father 69    Heart disease Paternal Uncle     Nephrolithiasis Paternal Grandfather     Heart disease Paternal Grandfather 70    Nephrolithiasis Other         niece    Cancer Neg Hx        SOCIAL HISTORY:    Social History     Socioeconomic History    Marital status:      Spouse name: Not on file    Number of children: Not on file    Years of education: Not on file    Highest education level: Not on file   Social Needs    Financial resource strain: Not on file    Food insecurity - worry: Not on file    Food insecurity - inability: Not on file    Transportation needs - medical: Not on file    Transportation needs - non-medical: Not on file   Occupational History    Not on file   Tobacco Use    Smoking status: Never Smoker    Smokeless tobacco: Never Used   Substance and Sexual Activity    Alcohol use: Yes     Comment: occasional    Drug use: No    Sexual activity: Yes     Partners: Male   Other Topics Concern    Not on file   Social History Narrative    Not on file       PHYSICAL EXAM:  VITAL SIGNS:   Vitals:    12/05/18 0917   BP: 116/75   BP Location: Left arm   Patient Position: Sitting   BP Method: Medium (Automatic)   Pulse: 76   Temp: 98.7 °F (37.1 °C)   TempSrc: Oral   Weight: 64 kg (141 lb 1.5 oz)   Height: 5' 3" (1.6 m)     GENERAL:  Patient appears well nourished, sitting on exam table, in no acute distress.  HEENT:  Atraumatic, normocephalic, PERRLA, EOMI, no conjunctival injection, sclerae are anicteric, normal external auditory canals,TMs clear b/l, gross hearing intact to whisper, MMM, no oropharygneal erythema or exudate.  NECK:  Supple, normal ROM, trachea is midline , no supraclavicular or cervical LAD or masses palpated.  Thyroid gland not palpable.  CARDIOVASCULAR:  " RRR, normal S1 and S2, no m/r/g.  RESPIRATORY:  CTA b/l, no wheezes, rhonchi, rales.  No increased work of breathing, no  use of accessory muscles.  ABDOMEN:  Soft, nontender, nondistended, normoactive bowel sounds in all four quadrants, no rebound or guarding, no HSM or masses palpated.  Normal percussion.  EXTREMITIES:  2+ DP pulses b/l, no edema.  SKIN:  Warm, no lesions on exposed skin.  NEUROMUSCULAR:    2+ patellar and achilles reflexes b/l.  Steady gait.  Cranial nerves II-XII grossly intact.  No clubbing or cyanosis of digits/nails.      PSYCH:  Patient is alert and oriented to person, time, place.  Patient is appropriately dressed and groomed. There is normal eye contact. Rate and tone of speech is normal. Normal thought content and process. normal insight and judgement. Patient describes her mood as good, affect is euthymic.         ASSESSMENT/PLAN: This is a 58 y.o. female who presents to clinic  For evaluation of the following concerns  1. Hyperlipidemia, unspecified hyperlipidemia type  - Comprehensive metabolic panel; Future  - Lipid panel; Future  - Calcium, ionized; Future    2. Hashimoto's thyroiditis  Follow up with Dr. Beaulieu    3. Hypercalcemia  - Calcium, ionized; Future    4. Immunization due  - Influenza - Quadrivalent (3 years & older) (PF)    5. Post herpetic neuralgia  Place on trial of gabapentin. Update clinic in 2-3 weeks.       Marlin Estrada MD

## 2018-12-17 NOTE — TELEPHONE ENCOUNTER
----- Message from Elisa Benson sent at 6/6/2018  9:32 AM CDT -----  Type:  Test Results    Who Called: pt  Name of Test (Lab/Mammo/Etc):lab  Date of Test: na  Ordering Provider:  rian   Where the test was performed:  [email or web address]  Best Call Back Number: [phone]  Additional Information:  na     Surgeon Performing Repair (Optional): Deng Goetz MD

## 2018-12-24 ENCOUNTER — CLINICAL SUPPORT (OUTPATIENT)
Dept: URGENT CARE | Facility: CLINIC | Age: 58
End: 2018-12-24
Payer: COMMERCIAL

## 2018-12-24 VITALS
BODY MASS INDEX: 25.52 KG/M2 | OXYGEN SATURATION: 98 % | RESPIRATION RATE: 16 BRPM | HEIGHT: 63 IN | WEIGHT: 144 LBS | TEMPERATURE: 98 F | SYSTOLIC BLOOD PRESSURE: 117 MMHG | HEART RATE: 71 BPM | DIASTOLIC BLOOD PRESSURE: 75 MMHG

## 2018-12-24 DIAGNOSIS — J01.90 ACUTE NON-RECURRENT SINUSITIS, UNSPECIFIED LOCATION: Primary | ICD-10-CM

## 2018-12-24 PROCEDURE — 99204 OFFICE O/P NEW MOD 45 MIN: CPT | Mod: 25,S$GLB,, | Performed by: NURSE PRACTITIONER

## 2018-12-24 PROCEDURE — 96372 THER/PROPH/DIAG INJ SC/IM: CPT | Mod: S$GLB,,, | Performed by: NURSE PRACTITIONER

## 2018-12-24 RX ORDER — PREDNISONE 20 MG/1
20 TABLET ORAL 2 TIMES DAILY
Qty: 10 TABLET | Refills: 0 | Status: SHIPPED | OUTPATIENT
Start: 2018-12-24 | End: 2018-12-29

## 2018-12-24 RX ORDER — CEFDINIR 300 MG/1
300 CAPSULE ORAL 2 TIMES DAILY
Qty: 20 CAPSULE | Refills: 0 | Status: SHIPPED | OUTPATIENT
Start: 2018-12-24 | End: 2018-12-24 | Stop reason: SDUPTHER

## 2018-12-24 RX ORDER — CETIRIZINE HYDROCHLORIDE 10 MG/1
10 TABLET ORAL DAILY
Qty: 30 TABLET | Refills: 2 | Status: SHIPPED | OUTPATIENT
Start: 2018-12-24 | End: 2018-12-24 | Stop reason: SDUPTHER

## 2018-12-24 RX ORDER — FLUTICASONE PROPIONATE 50 MCG
2 SPRAY, SUSPENSION (ML) NASAL 2 TIMES DAILY
Qty: 1 BOTTLE | Refills: 0 | Status: SHIPPED | OUTPATIENT
Start: 2018-12-24 | End: 2019-06-03

## 2018-12-24 RX ORDER — FLUTICASONE PROPIONATE 50 MCG
2 SPRAY, SUSPENSION (ML) NASAL 2 TIMES DAILY
Qty: 1 BOTTLE | Refills: 0 | Status: SHIPPED | OUTPATIENT
Start: 2018-12-24 | End: 2018-12-24 | Stop reason: SDUPTHER

## 2018-12-24 RX ORDER — CEFDINIR 300 MG/1
300 CAPSULE ORAL 2 TIMES DAILY
Qty: 20 CAPSULE | Refills: 0 | Status: SHIPPED | OUTPATIENT
Start: 2018-12-24 | End: 2019-01-03

## 2018-12-24 RX ORDER — CETIRIZINE HYDROCHLORIDE 10 MG/1
10 TABLET ORAL DAILY
Qty: 30 TABLET | Refills: 2 | Status: SHIPPED | OUTPATIENT
Start: 2018-12-24 | End: 2019-06-03

## 2018-12-24 RX ORDER — PREDNISONE 20 MG/1
20 TABLET ORAL 2 TIMES DAILY
Qty: 10 TABLET | Refills: 0 | Status: SHIPPED | OUTPATIENT
Start: 2018-12-24 | End: 2018-12-24 | Stop reason: SDUPTHER

## 2018-12-24 RX ORDER — DEXAMETHASONE SODIUM PHOSPHATE 4 MG/ML
8 INJECTION, SOLUTION INTRA-ARTICULAR; INTRALESIONAL; INTRAMUSCULAR; INTRAVENOUS; SOFT TISSUE
Status: COMPLETED | OUTPATIENT
Start: 2018-12-24 | End: 2018-12-24

## 2018-12-24 RX ADMIN — DEXAMETHASONE SODIUM PHOSPHATE 8 MG: 4 INJECTION, SOLUTION INTRA-ARTICULAR; INTRALESIONAL; INTRAMUSCULAR; INTRAVENOUS; SOFT TISSUE at 01:12

## 2018-12-24 NOTE — PROGRESS NOTES
"Subjective:       Patient ID: Karen Muhammad is a 58 y.o. female.    Vitals:  height is 5' 3" (1.6 m) and weight is 65.3 kg (144 lb). Her oral temperature is 97.7 °F (36.5 °C). Her blood pressure is 117/75 and her pulse is 71. Her respiration is 16 and oxygen saturation is 98%.     Chief Complaint: Sinus Problem    Sinus Problem   This is a new problem. The current episode started in the past 7 days. The problem has been rapidly worsening since onset. There has been no fever. Associated symptoms include congestion, headaches and sinus pressure. Pertinent negatives include no chills, coughing, shortness of breath or sore throat. Treatments tried: advil.       Constitution: Negative for chills, fatigue and fever.   HENT: Positive for congestion, postnasal drip, sinus pain and sinus pressure. Negative for sore throat.         Constant clearing throat   Neck: Negative for painful lymph nodes.   Cardiovascular: Negative for chest pain and leg swelling.   Eyes: Negative for double vision and blurred vision.   Respiratory: Negative for cough and shortness of breath.    Gastrointestinal: Negative for nausea, vomiting and diarrhea.   Genitourinary: Negative for dysuria, frequency, urgency and history of kidney stones.   Musculoskeletal: Negative for joint pain, joint swelling, muscle cramps and muscle ache.   Skin: Negative for color change, pale, rash and bruising.   Allergic/Immunologic: Negative for seasonal allergies.   Neurological: Positive for headaches. Negative for dizziness, history of vertigo, light-headedness and passing out.   Hematologic/Lymphatic: Negative for swollen lymph nodes.   Psychiatric/Behavioral: Negative for nervous/anxious, sleep disturbance and depression. The patient is not nervous/anxious.        Objective:      Physical Exam   Constitutional: She is oriented to person, place, and time. Vital signs are normal. She appears well-developed and well-nourished. She is cooperative.   HENT: "   Head: Normocephalic.   Right Ear: Hearing, external ear and ear canal normal. A middle ear effusion is present.   Left Ear: Hearing, external ear and ear canal normal. A middle ear effusion is present.   Nose: Mucosal edema and rhinorrhea present. Right sinus exhibits maxillary sinus tenderness. Left sinus exhibits maxillary sinus tenderness.   Mouth/Throat: Uvula is midline and mucous membranes are normal. Posterior oropharyngeal erythema present.   Localized swelling to left cheek, tender to touch, no deformity   Eyes: Conjunctivae, EOM and lids are normal. Pupils are equal, round, and reactive to light.   Neck: Trachea normal, normal range of motion, full passive range of motion without pain and phonation normal. Neck supple.   Cardiovascular: Normal rate, regular rhythm, normal heart sounds, intact distal pulses and normal pulses.   Pulmonary/Chest: Effort normal and breath sounds normal.   Abdominal: Soft. Normal appearance, normal aorta and bowel sounds are normal. There is no tenderness.   Musculoskeletal: Normal range of motion.   Neurological: She is alert and oriented to person, place, and time. She has normal strength. GCS eye subscore is 4. GCS verbal subscore is 5. GCS motor subscore is 6.   Skin: Skin is warm, dry and intact. Capillary refill takes less than 2 seconds.   Psychiatric: She has a normal mood and affect. Her speech is normal and behavior is normal. Judgment and thought content normal. Cognition and memory are normal.       Assessment:       1. Acute non-recurrent sinusitis, unspecified location        Plan:         Acute non-recurrent sinusitis, unspecified location  -     dexamethasone injection 8 mg  -     cefdinir (OMNICEF) 300 MG capsule; Take 1 capsule (300 mg total) by mouth 2 (two) times daily. for 10 days  Dispense: 20 capsule; Refill: 0  -     cetirizine (ZYRTEC) 10 MG tablet; Take 1 tablet (10 mg total) by mouth once daily.  Dispense: 30 tablet; Refill: 2  -     fluticasone  (FLONASE) 50 mcg/actuation nasal spray; 2 sprays (100 mcg total) by Each Nare route 2 (two) times daily.  Dispense: 1 Bottle; Refill: 0  -     predniSONE (DELTASONE) 20 MG tablet; Take 1 tablet (20 mg total) by mouth 2 (two) times daily. for 5 days  Dispense: 10 tablet; Refill: 0

## 2019-01-03 ENCOUNTER — PATIENT MESSAGE (OUTPATIENT)
Dept: FAMILY MEDICINE | Facility: CLINIC | Age: 59
End: 2019-01-03

## 2019-01-03 ENCOUNTER — CLINICAL SUPPORT (OUTPATIENT)
Dept: URGENT CARE | Facility: CLINIC | Age: 59
End: 2019-01-03
Payer: COMMERCIAL

## 2019-01-03 VITALS
OXYGEN SATURATION: 98 % | DIASTOLIC BLOOD PRESSURE: 80 MMHG | RESPIRATION RATE: 16 BRPM | TEMPERATURE: 98 F | HEIGHT: 64 IN | BODY MASS INDEX: 23.73 KG/M2 | WEIGHT: 139 LBS | SYSTOLIC BLOOD PRESSURE: 124 MMHG | HEART RATE: 91 BPM

## 2019-01-03 DIAGNOSIS — B34.9 VIRAL ILLNESS: Primary | ICD-10-CM

## 2019-01-03 DIAGNOSIS — R68.89 FLU-LIKE SYMPTOMS: ICD-10-CM

## 2019-01-03 LAB
CTP QC/QA: YES
FLUAV AG NPH QL: NEGATIVE
FLUBV AG NPH QL: NEGATIVE

## 2019-01-03 PROCEDURE — 87804 POCT INFLUENZA A/B: ICD-10-PCS | Mod: 59,QW,S$GLB, | Performed by: NURSE PRACTITIONER

## 2019-01-03 PROCEDURE — 99214 OFFICE O/P EST MOD 30 MIN: CPT | Mod: 25,S$GLB,, | Performed by: NURSE PRACTITIONER

## 2019-01-03 PROCEDURE — 96372 PR INJECTION,THERAP/PROPH/DIAG2ST, IM OR SUBCUT: ICD-10-PCS | Mod: S$GLB,,, | Performed by: NURSE PRACTITIONER

## 2019-01-03 PROCEDURE — 96372 THER/PROPH/DIAG INJ SC/IM: CPT | Mod: S$GLB,,, | Performed by: NURSE PRACTITIONER

## 2019-01-03 PROCEDURE — 87804 INFLUENZA ASSAY W/OPTIC: CPT | Mod: QW,S$GLB,, | Performed by: NURSE PRACTITIONER

## 2019-01-03 PROCEDURE — 99214 PR OFFICE/OUTPT VISIT, EST, LEVL IV, 30-39 MIN: ICD-10-PCS | Mod: 25,S$GLB,, | Performed by: NURSE PRACTITIONER

## 2019-01-03 RX ORDER — ONDANSETRON 4 MG/1
4 TABLET, ORALLY DISINTEGRATING ORAL EVERY 6 HOURS PRN
Qty: 30 TABLET | Refills: 0 | Status: SHIPPED | OUTPATIENT
Start: 2019-01-03 | End: 2019-06-03

## 2019-01-03 RX ORDER — KETOROLAC TROMETHAMINE 30 MG/ML
30 INJECTION, SOLUTION INTRAMUSCULAR; INTRAVENOUS
Status: COMPLETED | OUTPATIENT
Start: 2019-01-03 | End: 2019-01-03

## 2019-01-03 RX ORDER — DICLOFENAC SODIUM 50 MG/1
50 TABLET, DELAYED RELEASE ORAL 3 TIMES DAILY PRN
Qty: 30 TABLET | Refills: 0 | Status: SHIPPED | OUTPATIENT
Start: 2019-01-03 | End: 2019-06-03

## 2019-01-03 RX ORDER — PROMETHAZINE HYDROCHLORIDE 25 MG/1
25 TABLET ORAL EVERY 4 HOURS PRN
Qty: 30 TABLET | Refills: 0 | Status: SHIPPED | OUTPATIENT
Start: 2019-01-03 | End: 2019-06-03

## 2019-01-03 RX ORDER — DEXAMETHASONE SODIUM PHOSPHATE 4 MG/ML
8 INJECTION, SOLUTION INTRA-ARTICULAR; INTRALESIONAL; INTRAMUSCULAR; INTRAVENOUS; SOFT TISSUE
Status: COMPLETED | OUTPATIENT
Start: 2019-01-03 | End: 2019-01-03

## 2019-01-03 RX ADMIN — DEXAMETHASONE SODIUM PHOSPHATE 8 MG: 4 INJECTION, SOLUTION INTRA-ARTICULAR; INTRALESIONAL; INTRAMUSCULAR; INTRAVENOUS; SOFT TISSUE at 04:01

## 2019-01-03 RX ADMIN — KETOROLAC TROMETHAMINE 30 MG: 30 INJECTION, SOLUTION INTRAMUSCULAR; INTRAVENOUS at 04:01

## 2019-01-03 NOTE — PATIENT INSTRUCTIONS
"ER for any concerns or worsening symptoms      Viral Syndrome (Adult)  A viral illness may cause a number of symptoms. The symptoms depend on the part of the body that the virus affects. If it settles in your nose, throat, and lungs, it may cause cough, sore throat, congestion, and sometimes headache. If it settles in your stomach and intestinal tract, it may cause vomiting and diarrhea. Sometimes it causes vague symptoms like "aching all over," feeling tired, loss of appetite, or fever.  A viral illness usually lasts 1 to 2 weeks, but sometimes it lasts longer. In some cases, a more serious infection can look like a viral syndrome in the first few days of the illness. You may need another exam and additional tests to know the difference. Watch for the warning signs listed below.  Home care  Follow these guidelines for taking care of yourself at home:  · If symptoms are severe, rest at home for the first 2 to 3 days.  · Stay away from cigarette smoke - both your smoke and the smoke from others.  · You may use over-the-counter acetaminophen or ibuprofen for fever, muscle aching, and headache, unless another medicine was prescribed for this. If you have chronic liver or kidney disease or ever had a stomach ulcer or GI bleeding, talk with your doctor before using these medicines. No one who is younger than 18 and ill with a fever should take aspirin. It may cause severe disease or death.  · Your appetite may be poor, so a light diet is fine. Avoid dehydration by drinking 8 to 12 8-ounce glasses of fluids each day. This may include water; orange juice; lemonade; apple, grape, and cranberry juice; clear fruit drinks; electrolyte replacement and sports drinks; and decaffeinated teas and coffee. If you have been diagnosed with a kidney disease, ask your doctor how much and what types of fluids you should drink to prevent dehydration. If you have kidney disease, drinking too much fluid can cause it build up in the your body " and be dangerous to your health.  · Over-the-counter remedies won't shorten the length of the illness but may be helpful for cough, sore throat; and nasal and sinus congestion. Don't use decongestants if you have high blood pressure.  Follow-up care  Follow up with your healthcare provider if you do not improve over the next week.  Call 911  Get emergency medical care if any of the following occur:  · Convulsion  · Feeling weak, dizzy, or like you are going to faint  · Chest pain, shortness of breath, wheezing, or difficulty breathing  When to seek medical advice  Call your healthcare provider right away if any of these occur:  · Cough with lots of colored sputum (mucus) or blood in your sputum  · Chest pain, shortness of breath, wheezing, or difficulty breathing  · Severe headache; face, neck, or ear pain  · Severe, constant pain in the lower right side of your belly (abdominal)  · Continued vomiting (cant keep liquids down)  · Frequent diarrhea (more than 5 times a day); blood (red or black color) or mucus in diarrhea  · Feeling weak, dizzy, or like you are going to faint  · Extreme thirst  · Fever of 100.4°F (38°C) or higher, or as directed by your healthcare provider  Date Last Reviewed: 9/25/2015  © 5851-6718 [employer] [address] [place] [address], [place] [address]. All rights reserved. This information is not intended as a substitute for professional medical care. Always follow your healthcare professional's instructions.

## 2019-01-03 NOTE — PROGRESS NOTES
"Subjective:       Patient ID: Karen Muhammad is a 58 y.o. female.    Vitals:  height is 5' 4" (1.626 m) and weight is 63 kg (139 lb). Her temperature is 98 °F (36.7 °C). Her blood pressure is 124/80 and her pulse is 91. Her respiration is 16 and oxygen saturation is 98%.     Chief Complaint: Generalized Body Aches    Nausea   This is a new problem. The current episode started in the past 7 days. The problem occurs constantly. Associated symptoms include chills, fatigue, headaches, myalgias and nausea. Pertinent negatives include no arthralgias, chest pain, congestion, coughing, fever, joint swelling, neck pain, numbness, rash, sore throat, vertigo, vomiting or weakness. Associated symptoms comments: diarrhea.       Constitution: Positive for chills, fatigue and generalized weakness. Negative for fever.   HENT: Negative for congestion and sore throat.    Neck: Negative for neck pain, neck stiffness and painful lymph nodes.   Cardiovascular: Negative for chest pain and leg swelling.   Eyes: Negative for double vision and blurred vision.   Respiratory: Negative for cough and shortness of breath.    Gastrointestinal: Positive for nausea. Negative for vomiting and diarrhea.   Genitourinary: Negative for dysuria, frequency, urgency and history of kidney stones.   Musculoskeletal: Positive for muscle ache. Negative for joint pain, joint swelling and muscle cramps.   Skin: Negative for color change, pale, rash and bruising.   Allergic/Immunologic: Negative for seasonal allergies.   Neurological: Positive for headaches and history of migraines. Negative for dizziness, history of vertigo, light-headedness, passing out, facial drooping, speech difficulty, coordination disturbances, loss of balance, disorientation, altered mental status, loss of consciousness, numbness, tingling, seizures and tremors.   Hematologic/Lymphatic: Negative for swollen lymph nodes.   Psychiatric/Behavioral: Negative for altered mental status, " disorientation, nervous/anxious, sleep disturbance and depression. The patient is not nervous/anxious.        Objective:      Physical Exam   Constitutional: She is oriented to person, place, and time. She appears well-developed and well-nourished. She is cooperative.  Non-toxic appearance. She does not appear ill. No distress.   HENT:   Head: Normocephalic and atraumatic.   Right Ear: Hearing, tympanic membrane, external ear and ear canal normal.   Left Ear: Hearing, tympanic membrane, external ear and ear canal normal.   Nose: Nose normal. No mucosal edema, rhinorrhea or nasal deformity. No epistaxis. Right sinus exhibits no maxillary sinus tenderness and no frontal sinus tenderness. Left sinus exhibits no maxillary sinus tenderness and no frontal sinus tenderness.   Mouth/Throat: Uvula is midline, oropharynx is clear and moist and mucous membranes are normal. No trismus in the jaw. Normal dentition. No uvula swelling. No posterior oropharyngeal erythema.   Eyes: Conjunctivae and lids are normal. Right eye exhibits no discharge. Left eye exhibits no discharge. No scleral icterus.   Sclera clear bilat   Neck: Trachea normal, normal range of motion, full passive range of motion without pain and phonation normal. Neck supple.   Cardiovascular: Normal rate, regular rhythm, normal heart sounds, intact distal pulses and normal pulses.   Pulmonary/Chest: Effort normal and breath sounds normal. No respiratory distress.   Abdominal: Soft. Normal appearance and bowel sounds are normal. She exhibits no distension, no pulsatile midline mass and no mass. There is no tenderness.   Musculoskeletal: Normal range of motion. She exhibits no edema or deformity.   Neurological: She is alert and oriented to person, place, and time. She has normal strength. No cranial nerve deficit or sensory deficit. She exhibits normal muscle tone. Coordination normal. GCS eye subscore is 4. GCS verbal subscore is 5. GCS motor subscore is 6.   Skin:  Skin is warm, dry and intact. She is not diaphoretic. No pallor.   Psychiatric: She has a normal mood and affect. Her speech is normal and behavior is normal. Judgment and thought content normal. Cognition and memory are normal.   Nursing note and vitals reviewed.      Assessment:       1. Viral illness    2. Flu-like symptoms        Plan:         Viral illness    Flu-like symptoms  -     POCT Influenza A/B    Other orders  -     baloxavir marboxil (XOFLUZA) 40 mg Tab; Take 40 mg by mouth once. for 1 dose  Dispense: 1 tablet; Refill: 0  -     dexamethasone injection 8 mg  -     ondansetron (ZOFRAN-ODT) 4 MG TbDL; Take 1 tablet (4 mg total) by mouth every 6 (six) hours as needed.  Dispense: 30 tablet; Refill: 0  -     promethazine (PHENERGAN) 25 MG tablet; Take 1 tablet (25 mg total) by mouth every 4 (four) hours as needed for Nausea.  Dispense: 30 tablet; Refill: 0  -     ketorolac injection 30 mg  -     diclofenac (VOLTAREN) 50 MG EC tablet; Take 1 tablet (50 mg total) by mouth 3 (three) times daily as needed.  Dispense: 30 tablet; Refill: 0

## 2019-04-25 ENCOUNTER — PATIENT OUTREACH (OUTPATIENT)
Dept: ADMINISTRATIVE | Facility: HOSPITAL | Age: 59
End: 2019-04-25

## 2019-06-03 ENCOUNTER — OFFICE VISIT (OUTPATIENT)
Dept: FAMILY MEDICINE | Facility: CLINIC | Age: 59
End: 2019-06-03
Payer: COMMERCIAL

## 2019-06-03 VITALS
HEART RATE: 85 BPM | HEIGHT: 64 IN | DIASTOLIC BLOOD PRESSURE: 74 MMHG | SYSTOLIC BLOOD PRESSURE: 108 MMHG | BODY MASS INDEX: 24.41 KG/M2 | WEIGHT: 143 LBS

## 2019-06-03 DIAGNOSIS — E06.3 HASHIMOTO'S THYROIDITIS: ICD-10-CM

## 2019-06-03 DIAGNOSIS — F41.8 MIXED ANXIETY DEPRESSIVE DISORDER: ICD-10-CM

## 2019-06-03 DIAGNOSIS — E78.5 HYPERLIPIDEMIA, UNSPECIFIED HYPERLIPIDEMIA TYPE: Primary | ICD-10-CM

## 2019-06-03 DIAGNOSIS — E83.52 HYPERCALCEMIA: ICD-10-CM

## 2019-06-03 DIAGNOSIS — G43.009 MIGRAINE WITHOUT AURA AND WITHOUT STATUS MIGRAINOSUS, NOT INTRACTABLE: ICD-10-CM

## 2019-06-03 DIAGNOSIS — R79.89 LOW VITAMIN D LEVEL: ICD-10-CM

## 2019-06-03 PROCEDURE — 3008F BODY MASS INDEX DOCD: CPT | Mod: ,,, | Performed by: INTERNAL MEDICINE

## 2019-06-03 PROCEDURE — 99204 OFFICE O/P NEW MOD 45 MIN: CPT | Mod: ,,, | Performed by: INTERNAL MEDICINE

## 2019-06-03 PROCEDURE — 99204 PR OFFICE/OUTPT VISIT, NEW, LEVL IV, 45-59 MIN: ICD-10-PCS | Mod: ,,, | Performed by: INTERNAL MEDICINE

## 2019-06-03 PROCEDURE — 3008F PR BODY MASS INDEX (BMI) DOCUMENTED: ICD-10-PCS | Mod: ,,, | Performed by: INTERNAL MEDICINE

## 2019-06-03 RX ORDER — NAPROXEN 500 MG/1
1 TABLET ORAL DAILY PRN
Refills: 10 | COMMUNITY
Start: 2019-02-25 | End: 2023-06-23 | Stop reason: SDUPTHER

## 2019-06-03 RX ORDER — GLUCOSAMINE/CHONDRO SU A 500-400 MG
1 TABLET ORAL 3 TIMES DAILY
COMMUNITY

## 2019-06-03 RX ORDER — CALCIUM CARBONATE 500(1250)
3 TABLET ORAL DAILY
COMMUNITY
End: 2020-12-07

## 2019-06-03 NOTE — PROGRESS NOTES
Subjective:       Patient ID: Karen Mhuammad is a 59 y.o. female.    Chief Complaint: Thyroid Problem; Hyperlipidemia; Migraine; and Anxiety/depression    MrsOfelia Muhammad, a pleasant 59-year-old  female first visit to establish as a new patient. Thus far she was seeing Dr. Marlin Estrada at [facility] who has since relocated.    Patient's medical issues include hyperlipidemia for which she is taking atorvastatin 20 mg at bedtime. She also has hypothyroidism attributed to Hashimoto's for which she takes levothyroxine 88 µg per day. She has had long-standing history of migraines for which she takes Maxalt 10 mg per day as needed.    She also takes aspirin to reduce her cardiovascular risk. (Please note that she actually does not have coronary artery disease or any history of strokelike symptoms or at risk for stroke or coronary artery disease). She does not have hypertension or diabetes. She does not smoke cigarettes. There is indeed some family history of cardiac disease on her father's side of family.    Sometimes in 2018 on a general chemistry panel she was found to have a slightly elevated calcium. When not sure about the cause of this elevated calcium. She had a repeat test after that which persisted to show this finding and indeed she was given order for checking calcium and ionized calcium again by her primary care physician.    She does see Dr. Zhang for hypothyroidism takes care of her thyroid issues. I'm not sure if Dr. Zhang is also a wet that her calcium level was elevated.    Over several years probably after the diagnosis of thyroid and also losing her parents in past she has been prescribed citalopram 40 mg. This seems to be helping her and on deep questioning she states that it maintains her calmness and composure and without this medication she might sometimes be somewhat snippy or edgy.    Thyroid Problem   Presents for follow-up visit. Symptoms include fatigue  (mild fatigue). Patient reports no anxiety, cold intolerance, constipation, diaphoresis, diarrhea, hair loss, heat intolerance, hoarse voice, leg swelling, nail problem, palpitations, tremors, visual change, weight gain or weight loss. The symptoms have been stable. Her past medical history is significant for hyperlipidemia. There is no history of diabetes.   Hyperlipidemia   This is a chronic problem. The current episode started more than 1 year ago. The problem is controlled. Exacerbating diseases include hypothyroidism. She has no history of chronic renal disease, diabetes, liver disease or obesity. Pertinent negatives include no chest pain or shortness of breath. Current antihyperlipidemic treatment includes statins. The current treatment provides moderate improvement of lipids. Risk factors for coronary artery disease include dyslipidemia and post-menopausal.   Migraine    This is a chronic problem. The current episode started more than 1 year ago. The problem occurs intermittently. The problem has been waxing and waning. The pain is located in the bilateral and frontal region. The pain quality is similar to prior headaches. The pain is moderate. Pertinent negatives include no abdominal pain, coughing, dizziness, eye pain, fever, seizures, sinus pressure, visual change or weight loss. She has tried triptans for the symptoms. The treatment provided moderate relief. There is no history of cluster headaches, hypertension, immunosuppression, migraine headaches, migraines in the family, obesity, pseudotumor cerebri, recent head traumas or sinus disease.       Past Medical History:   Diagnosis Date    Anticoagulant long-term use     Anxiety     Depression     Hyperlipidemia     Migraine     Thyroid disease      Social History     Socioeconomic History    Marital status:      Spouse name: Shruthi Muhammad    Number of children: 1    Years of education: Not on file    Highest education level: Not on  file   Occupational History    Occupation: Inurance Agent      Comment: [employer] agency   Social Needs    Financial resource strain: Not on file    Food insecurity:     Worry: Not on file     Inability: Not on file    Transportation needs:     Medical: Not on file     Non-medical: Not on file   Tobacco Use    Smoking status: Never Smoker    Smokeless tobacco: Never Used   Substance and Sexual Activity    Alcohol use: Yes     Comment: occasional    Drug use: No    Sexual activity: Yes     Partners: Male   Lifestyle    Physical activity:     Days per week: Not on file     Minutes per session: Not on file    Stress: Not on file   Relationships    Social connections:     Talks on phone: Not on file     Gets together: Not on file     Attends [employer] service: Not on file     Active member of club or organization: Not on file     Attends meetings of clubs or organizations: Not on file     Relationship status: Not on file   Other Topics Concern    Not on file   Social History Narrative    Not on file     Past Surgical History:   Procedure Laterality Date    COLONOSCOPY N/A 7/29/2015    Performed by [facility]    EYE SURGERY  1990    pterygium removed    MOLE REMOVAL  1997    premalignant    WISDOM TOOTH EXTRACTION       Family History   Problem Relation Age of Onset    Nephrolithiasis Father     Heart disease Father 69    Arthritis Mother         RA    Diabetes Mother     Heart disease Paternal Uncle     Nephrolithiasis Paternal Grandfather     Heart disease Paternal Grandfather 70    Nephrolithiasis Other         niece    Cancer Neg Hx        Review of Systems   Constitutional: Positive for fatigue (mild fatigue). Negative for activity change, chills, diaphoresis, fever, unexpected weight change, weight gain and weight loss.   HENT: Negative for congestion, hoarse voice, postnasal drip and sinus pressure.    Eyes: Negative for pain, discharge and visual  "disturbance.   Respiratory: Negative for cough, chest tightness and shortness of breath.    Cardiovascular: Negative for chest pain, palpitations and leg swelling.        Hyperlipidemia   Gastrointestinal: Negative for abdominal distention, abdominal pain, anal bleeding, constipation and diarrhea.   Endocrine: Negative for cold intolerance, heat intolerance, polydipsia and polyphagia.        Hypoactive thyroid  Mildly elevated calcium   Genitourinary: Negative for difficulty urinating, dysuria, flank pain, frequency and pelvic pain.   Musculoskeletal: Negative for arthralgias and joint swelling.   Skin: Negative for color change, pallor and rash.   Allergic/Immunologic: Negative for environmental allergies, food allergies and immunocompromised state.   Neurological: Negative for dizziness, tremors, seizures, syncope, light-headedness and headaches.   Hematological: Negative for adenopathy. Does not bruise/bleed easily.   Psychiatric/Behavioral: Positive for behavioral problems (mild depressive anxiety). Negative for agitation, confusion and dysphoric mood. The patient is not nervous/anxious.          Objective:      Blood pressure 108/74, pulse 85, height 5' 4" (1.626 m), weight 64.9 kg (143 lb). Body mass index is 24.55 kg/m².  Physical Exam   Constitutional: She is oriented to person, place, and time. She appears well-developed and well-nourished. She is cooperative. No distress.   HENT:   Head: Normocephalic and atraumatic.   Eyes: Pupils are equal, round, and reactive to light. Conjunctivae, EOM and lids are normal. Lids are everted and swept, no foreign bodies found. Right pupil is round and reactive. Left pupil is round and reactive.   Neck: Trachea normal and normal range of motion. Neck supple.   Cardiovascular: Normal rate, regular rhythm, S1 normal, S2 normal, normal heart sounds and intact distal pulses.   Pulmonary/Chest: Breath sounds normal.   Abdominal: Soft. Bowel sounds are normal. There is no " rigidity and no guarding.   Musculoskeletal: Normal range of motion.   Lymphadenopathy:     She has no cervical adenopathy.     She has no axillary adenopathy.   Neurological: She is alert and oriented to person, place, and time.   Skin: Skin is warm and dry. Capillary refill takes less than 2 seconds.   Psychiatric: Thought content normal.   Nursing note and vitals reviewed.        Assessment:       1. Hyperlipidemia, unspecified hyperlipidemia type    2. Hashimoto's thyroiditis    3. Hypercalcemia    4. Migraine without aura and without status migrainosus, not intractable    5. Mixed anxiety depressive disorder    6. Low vitamin D level           No visits with results within 3 Month(s) from this visit.   Latest known visit with results is:   Clinical Support on 01/03/2019   Component Date Value Ref Range Status    Rapid Influenza A Ag 01/03/2019 Negative  Negative Final    Rapid Influenza B Ag 01/03/2019 Negative  Negative Final     Acceptable 01/03/2019 Yes   Final         Plan:           Hyperlipidemia, unspecified hyperlipidemia type  -     Lipid panel; Future; Expected date: 06/03/2019  -     Comprehensive metabolic panel; Future; Expected date: 06/03/2019    Hashimoto's thyroiditis  -     TSH; Future; Expected date: 06/03/2019    Hypercalcemia  -     Comprehensive metabolic panel; Future; Expected date: 06/03/2019  -     PTH, intact; Future; Expected date: 06/03/2019  -     Calcium, ionized; Future; Expected date: 06/03/2019    Migraine without aura and without status migrainosus, not intractable    Mixed anxiety depressive disorder    Low vitamin D level  -     Vitamin D; Future; Expected date: 06/03/2019      Advised Ms. Muhammad about age and season appropriate immunizations/ cancer screenings.  Also seasonal influenza vaccine, update on tetanus diphtheria vaccination every 10 years.    I will check some labs especially calcium, general chemistry and PTH levels to be sure that we have  addressed the question of hypercalcemia. Not have any swelling of the neck to suggest either a thyroid or parathyroid gland swelling.    Thus far she has not been diagnosed with any chronic medical conditions like sarcoidosis.    I broached upon the issue of depression and anxiety and I had raised a question of steadily and  slowly wean off the medications.     Apparently in past, at least 3 or 4 attempts at weaning did not produce good results.      Today's physical examination is unremarkable.    I will see her back in about one month to 6 weeks' time to review all the medications her labs with her and then will set up a follow-up in 6 months to one year time.    Pt seen with MS Jorge Hudson 1 month              Current Outpatient Medications:     aspirin (ECOTRIN) 81 MG EC tablet, Take 81 mg by mouth once daily., Disp: , Rfl:     atorvastatin (LIPITOR) 20 MG tablet, Take 20 mg by mouth once daily., Disp: , Rfl:     calcium carbonate (OS-MAYA) 500 mg calcium (1,250 mg) tablet, Take 3 tablets by mouth once daily., Disp: , Rfl:     citalopram (CELEXA) 40 MG tablet, TAKE ONE TABLET BY MOUTH ONCE DAILY, Disp: 30 tablet, Rfl: 9    glucosamine-chondroitin 500-400 mg tablet, Take 1 tablet by mouth 3 (three) times daily., Disp: , Rfl:     levothyroxine (SYNTHROID) 88 MCG tablet, Take 88 mcg by mouth once daily., Disp: , Rfl:     MULTIVIT WITH CALCIUM,IRON,MIN (WOMEN'S MULTIPLE VITAMINS ORAL), Take by mouth., Disp: , Rfl:     naproxen (NAPROSYN) 500 MG tablet, 1 tablet daily as needed., Disp: , Rfl: 10    rizatriptan (MAXALT) 10 MG tablet, Take 10 mg by mouth as needed for Migraine., Disp: , Rfl:

## 2019-06-03 NOTE — PATIENT INSTRUCTIONS
Anxiety Reaction  Anxiety is the feeling we all get when we think something bad might happen. It is a normal response to stress and usually causes only a mild reaction. When anxiety becomes more severe, it can interfere with daily life. In some cases, you may not even be aware of what it is youre anxious about. There may also be a genetic link or it may be a learned behavior in the home.  Both psychological and physical triggers cause stress reaction. It's often a response to fear or emotional stress, real or imagined. This stress may come from home, family, work, or social relationships.  During an anxiety reaction, you may feel:  · Helpless  · Nervous  · Depressed  · Irritable  Your body may show signs of anxiety in many ways. You may experience:  · Dry mouth  · Shakiness  · Dizziness  · Weakness  · Trouble breathing  · Breathing fast (hyperventilating)  · Chest pressure  · Sweating  · Headache  · Nausea  · Diarrhea  · Tiredness  · Inability to sleep  · Sexual problems  Home care  · Try to locate the sources of stress in your life. They may not be obvious. These may include:  ¨ Daily hassles of life (traffic jams, missed appointments, car troubles, etc.)  ¨ Major life changes, both good (new baby, job promotion) and bad (loss of job, loss of loved one)  ¨ Overload: feeling that you have too many responsibilities and can't take care of all of them at once  ¨ Feeling helpless, feeling that your problems are beyond what youre able to solve  · Notice how your body reacts to stress. Learn to listen to your body signals. This will help you take action before the stress becomes severe.  · When you can, do something about the source of your stress. (Avoid hassles, limit the amount of change that happens in your life at one time and take a break when you feel overloaded).  · Unfortunately, many stressful situations can't be avoided. It is necessary to learn how to better manage stress. There are many proven methods  that will reduce your anxiety. These include simple things like exercise, good nutrition and adequate rest. Also, there are certain techniques that are helpful:  ¨ Relaxation  ¨ Breathing exercises  ¨ Visualization  ¨ Biofeedback  ¨ Meditation  For more information about this, consult your doctor or go to a local bookstore and review the many books and tapes available on this subject.  Follow-up care  If you feel that your anxiety is not responding to self-help measures, contact your doctor or make an appointment with a counselor. You may need short-term psychological counseling and temporary medicine to help you manage stress.  Call 911  Call your healthcare provider right away if any of these occur:  · Trouble breathing  · Confusion  · Drowsiness or trouble wakening  · Fainting or loss of consciousness  · Rapid heart rate  · Seizure  · New chest pain that becomes more severe, lasts longer, or spreads into your shoulder, arm, neck, jaw, or back  When to seek medical advice  Call your healthcare provider right away if any of these occur:  · Your symptoms get worse  · Severe headache not relieved by rest and mild pain reliever  Date Last Reviewed: 9/29/2015  © 9178-6056 [employer]. [address] [place] [address]. All rights reserved. This information is not intended as a substitute for professional medical care. Always follow your healthcare professional's instructions.        High Cholesterol  High cholesterol is also called hypercholesterolemia. Cholesterol and dietary fat are not the same thing. But, its important to understand how the fat in your diet affects your cholesterol level.  Your body needs cholesterol to build new cells and make certain hormones. There are 2 kinds of cholesterol in your body:  · HDL (good) cholesterol stops fatty deposits (plaque) from building up in your arteries. In this way it protects you against heart disease and stroke.  · LDL (bad) cholesterol stays  in your body and sticks to artery walls. It may later block blood flow to your heart and brain. This can cause a heart attack (acute myocardial infarction) or stroke.  Your body makes all the cholesterol it needs. But you also get cholesterol from many of the foods you eat. This is why you want to limit how much cholesterol you get in your diet  and how much fat you eat. Thats because the cholesterol your body makes from the fat you eat creates the most risk for disease. The type of fat you eat has the biggest influence on how much cholesterol your body makes.   Fats come in 2 kinds:  · Good fats are the unsaturated fats. These are also called monounsaturated and polyunsaturated fats. They raise the level of good cholesterol and lower the level of bad cholesterol. Good fats are found in vegetable oils like olive, sunflower, corn, and soybean oils, and in nuts and seeds.  · Bad fats are saturated fats and trans fats. These raise the risk for disease. They lower the good cholesterol and raise the level of bad cholesterol. Bad fats are found in all red meat and whole-milk dairy products. Some plants also have a lot of saturated fats such as coconut and palm plants. Trans fats are found in stick margarines and many fast foods and commercially baked goods. Soft margarine sold in tubs has less trans fat and is safer to use. Trans fat in particular raise bad cholesterol and lowers good cholesterol.  You can have high blood cholesterol if you eat a diet high in saturated fat and dont get much exercise. In some cases, your family history plays a role. Your health care provider can diagnose high cholesterol with blood tests. Treatment consists of a diet low in saturated fat, weight loss, and exercise. If these efforts dont lower your cholesterol, your provider may prescribe medicines. They must be taken daily to keep your cholesterol levels low. Being overweight also raises the risk for high cholesterol and heart disease.  Losing even a small amount of weight can help lower your risk.  High risk groups  Certain groups of people should talk to their healthcare provider about using cholesterol-lowering statin medicines for controlling their cholesterol to stay healthy or to prevent future heart attacks or stroke. It may be beneficial to take a medicine in addition to eating a healthy diet and exercising regularly for these groups. The major groups include:  · Adults who have had a heart attack or stroke or some other atherosclerotic disease (such as peripheral vascular disease), a transient ischemic attack, stable or unstable angina, and anyone who has had a procedure to restore blood flow through a blocked artery such as percutaneous coronary intervention, angioplasty, stent, open-heart bypass surgery.  · Adults who have diabetes or an elevated calculated risk of having a heart attack or stroke (7.5%) and an elevated level of LDL cholesterol  mg/dL.  · People who are 21 years of age and older who have an elevated LDL cholesterol level of 190 mg/dL or higher  Home care  Follow these guidelines when caring for yourself at home:  · Talk with your health care provider before starting a low-cholesterol diet or weight-loss program.  · In general, a low-cholesterol diet means that you eat less saturated fat (red meat and regular dairy) and less cholesterol each day. You may eat foods with unsaturated fats (vegetable oils, nuts, and seeds). Eat more fruits, vegetables, fish, and whole grains, or other high-fiber foods.  · Learn to read food labels so you know what you are eating.  · A registered dietitian can teach you how to plan meals and change your diet. You can ask your provider for a referral.  · Aim for 40 minutes of moderate to vigorous physical activity 3 to 4 times a week. Pick activities you enjoy. Walking is a good choice if you want to lose weight. If you have diabetes, hypertension, or heart disease, talk with your provider  to see what activities he or she recommends.  · If your provider has prescribed medicines, take them as directed.  · If you smoke, talk with your provider about how to quit smoking. Smoking lowers good cholesterol levels and can increase damage done by bad cholesterol.  · Limit how much alcohol you drink.  · If you have diabetes, talk with your provider and a dietitian about other food and lifestyle changes you can make to lower your risk for heart disease and stroke.  Follow-up care  Follow up with your healthcare provider, or as advised. It takes at least 3 months for dietary changes to show a result in your blood cholesterol. Have repeat blood testing as advised by your provider.  If an X-ray or ECG (electrocardiogram) was done, a specialist will look at it. You will be told of any new findings that may affect your care.  When to seek medical advice  Call your healthcare provider right away if any of these occur:  · Chest, arm, shoulder, neck, or upper back pain  · Shortness of breath  · Weakness or numbness of an arm, leg, or one side of the face  · Trouble with speech or vision  · Weakness, dizziness, or fainting  Talk to your healthcare provider about your treatment goals. Make sure you understand how cholesterol impacts you based on your personal health history and family history of heart disease or high cholesterol. Plan to have regular monitoring and follow up on any side effects that you may develop to the cholesterol-lowering medicines. Be aware that sometimes you may need more than one medicine to reach your cholesterol goals. Also make sure you understand how to prepare for your cholesterol testing which may or may not require fasting.  Date Last Reviewed: 1/1/2017  © 4716-7354 [employer]. [address] [place] [address], [place] [address]. All rights reserved. This information is not intended as a substitute for professional medical care. Always follow your healthcare professional's  instructions.

## 2019-07-05 ENCOUNTER — TELEPHONE (OUTPATIENT)
Dept: FAMILY MEDICINE | Facility: CLINIC | Age: 59
End: 2019-07-05

## 2019-07-05 LAB
25(OH)D3 SERPL-MCNC: 32 NG/ML (ref 30–100)
ALBUMIN SERPL-MCNC: 4.2 G/DL (ref 3.6–5.1)
ALBUMIN/GLOB SERPL: 1.7 (CALC) (ref 1–2.5)
ALP SERPL-CCNC: 76 U/L (ref 33–130)
ALT SERPL-CCNC: 31 U/L (ref 6–29)
AST SERPL-CCNC: 30 U/L (ref 10–35)
BILIRUB SERPL-MCNC: 0.5 MG/DL (ref 0.2–1.2)
BUN SERPL-MCNC: 13 MG/DL (ref 7–25)
BUN/CREAT SERPL: ABNORMAL (CALC) (ref 6–22)
CA-I SERPL-MCNC: 5.2 MG/DL (ref 4.8–5.6)
CALCIUM SERPL-MCNC: 9.6 MG/DL (ref 8.6–10.4)
CHLORIDE SERPL-SCNC: 106 MMOL/L (ref 98–110)
CHOLEST SERPL-MCNC: 162 MG/DL
CHOLEST/HDLC SERPL: 3 (CALC)
CO2 SERPL-SCNC: 29 MMOL/L (ref 20–32)
CREAT SERPL-MCNC: 0.71 MG/DL (ref 0.5–1.05)
GFRSERPLBLD MDRD-ARVRAT: 95 ML/MIN/1.73M2
GLOBULIN SER CALC-MCNC: 2.5 G/DL (CALC) (ref 1.9–3.7)
GLUCOSE SERPL-MCNC: 91 MG/DL (ref 65–99)
HDLC SERPL-MCNC: 54 MG/DL
LDLC SERPL CALC-MCNC: 90 MG/DL (CALC)
NONHDLC SERPL-MCNC: 108 MG/DL (CALC)
POTASSIUM SERPL-SCNC: 4.4 MMOL/L (ref 3.5–5.3)
PROT SERPL-MCNC: 6.7 G/DL (ref 6.1–8.1)
PTH-INTACT SERPL-MCNC: 78 PG/ML (ref 14–64)
SODIUM SERPL-SCNC: 141 MMOL/L (ref 135–146)
TRIGL SERPL-MCNC: 85 MG/DL
TSH SERPL-ACNC: 1.59 MIU/L (ref 0.4–4.5)

## 2019-07-08 ENCOUNTER — OFFICE VISIT (OUTPATIENT)
Dept: FAMILY MEDICINE | Facility: CLINIC | Age: 59
End: 2019-07-08
Payer: COMMERCIAL

## 2019-07-08 VITALS
BODY MASS INDEX: 24.75 KG/M2 | HEIGHT: 64 IN | HEART RATE: 80 BPM | DIASTOLIC BLOOD PRESSURE: 70 MMHG | WEIGHT: 145 LBS | SYSTOLIC BLOOD PRESSURE: 112 MMHG

## 2019-07-08 DIAGNOSIS — Z53.21 PATIENT LEFT WITHOUT BEING SEEN: Primary | ICD-10-CM

## 2019-07-08 PROCEDURE — 99499 UNLISTED E&M SERVICE: CPT | Mod: ,,, | Performed by: INTERNAL MEDICINE

## 2019-07-08 PROCEDURE — 99499 NO LOS: ICD-10-PCS | Mod: ,,, | Performed by: INTERNAL MEDICINE

## 2019-07-09 ENCOUNTER — TELEPHONE (OUTPATIENT)
Dept: FAMILY MEDICINE | Facility: CLINIC | Age: 59
End: 2019-07-09

## 2019-07-09 NOTE — TELEPHONE ENCOUNTER
----- Message from Jorge Lemons MD sent at 7/8/2019  8:11 PM CDT -----  Pt  called. It is ok to set up 1st appt in AM for her so that there is no delay

## 2019-07-22 ENCOUNTER — TELEPHONE (OUTPATIENT)
Dept: FAMILY MEDICINE | Facility: CLINIC | Age: 59
End: 2019-07-22

## 2019-07-29 ENCOUNTER — OFFICE VISIT (OUTPATIENT)
Dept: FAMILY MEDICINE | Facility: CLINIC | Age: 59
End: 2019-07-29
Payer: COMMERCIAL

## 2019-07-29 VITALS
HEIGHT: 64 IN | HEART RATE: 98 BPM | SYSTOLIC BLOOD PRESSURE: 134 MMHG | BODY MASS INDEX: 24.92 KG/M2 | DIASTOLIC BLOOD PRESSURE: 70 MMHG | WEIGHT: 146 LBS

## 2019-07-29 DIAGNOSIS — F32.A DEPRESSION, UNSPECIFIED DEPRESSION TYPE: ICD-10-CM

## 2019-07-29 DIAGNOSIS — Z23 NEED FOR SHINGLES VACCINE: ICD-10-CM

## 2019-07-29 DIAGNOSIS — G43.009 MIGRAINE WITHOUT AURA AND WITHOUT STATUS MIGRAINOSUS, NOT INTRACTABLE: Primary | ICD-10-CM

## 2019-07-29 DIAGNOSIS — E83.52 HYPERCALCEMIA: ICD-10-CM

## 2019-07-29 PROCEDURE — 3008F PR BODY MASS INDEX (BMI) DOCUMENTED: ICD-10-PCS | Mod: S$GLB,,, | Performed by: INTERNAL MEDICINE

## 2019-07-29 PROCEDURE — 99214 OFFICE O/P EST MOD 30 MIN: CPT | Mod: S$GLB,,, | Performed by: INTERNAL MEDICINE

## 2019-07-29 PROCEDURE — 3008F BODY MASS INDEX DOCD: CPT | Mod: S$GLB,,, | Performed by: INTERNAL MEDICINE

## 2019-07-29 PROCEDURE — 99999 PR PBB SHADOW E&M-EST. PATIENT-LVL IV: CPT | Mod: PBBFAC,,, | Performed by: INTERNAL MEDICINE

## 2019-07-29 PROCEDURE — 99999 PR PBB SHADOW E&M-EST. PATIENT-LVL IV: ICD-10-PCS | Mod: PBBFAC,,, | Performed by: INTERNAL MEDICINE

## 2019-07-29 PROCEDURE — 99214 PR OFFICE/OUTPT VISIT, EST, LEVL IV, 30-39 MIN: ICD-10-PCS | Mod: S$GLB,,, | Performed by: INTERNAL MEDICINE

## 2019-07-29 RX ORDER — CITALOPRAM 40 MG/1
40 TABLET, FILM COATED ORAL DAILY
Qty: 90 TABLET | Refills: 2 | Status: SHIPPED | OUTPATIENT
Start: 2019-07-29 | End: 2020-07-03

## 2019-07-29 NOTE — PATIENT INSTRUCTIONS
Migraine Headache  This often severe type of headache is different from other types of headaches in that symptoms other than pain occur with the headache. Nausea and vomiting, lightheadedness, sensitivity to light (photophobia), and other visual disturbances are common migraine symptoms. The pain may last from a few hours to several days. It is not clear why migraines occur but certain factors called triggers can raise the risk of having a migraine attack. A migraine may be triggered by emotional stress or depression, or by hormone changes during the menstrual cycle. Other triggers include birth control pills, overuse of migraine medicines, alcohol or caffeine, foods with tyramine (such as aged cheese and wine), eyestrain, weather changes, missed meals, or too little or too much sleep.  Home care  Follow these tips when taking care of yourself at home:  · Dont drive yourself home if you were given pain medicine for your headache or are having visual symptoms. Instead, have someone else drive you home. Try to sleep when you get home. You should feel much better when you wake up.  · Cold can help ease migraine symptoms. Put an ice pack on your forehead or at the base of your skull. Put heat on the back of your neck to help ease any neck spasm.  · Drink only clear liquids or eat a light diet until your symptoms get better. This will help you avoid nausea and vomiting.  How to prevent migraines  Pay attention to what seems to trigger your headache. Try to avoid the triggers when you can. If you have frequent headaches, consider keeping a headache diary. In it, write down what you were doing, feeling, or eating in the hours before each headache. Show this to your healthcare provider to help find the cause of your headaches.  If stress seems to be a trigger for your headaches, figure out what is causing stress in your life. Learn new ways to handle your stress. Ideas include regular exercise, biofeedback,  self-hypnosis, yoga, and meditation. Talk with your healthcare provider to find out more information about managing stress. Many books and digital media are also available on this subject.  Tyramine is a substance found in many foods. It can trigger a migraine in some people. These foods contain tyramine:  · Chocolate  · Yogurt  · All cheeses, but especially aged cheeses  · Smoked or pickled fish and meat, including herring, caviar, bologna, pepperoni, and salami  · Liver  · Avocados  · Bananas  · Figs  · Raisins  · Red wine  Try staying away from these foods for 1 to 2 months to see if you have fewer headaches.  How to treat future headaches  · Take time out at the first sign of a headache, if possible. Find a quiet, dark, comfortable place to sit or lie down. Let yourself relax or sleep.  · Put an ice pack on your forehead or on the area of greatest pain. A heating pad and massage may help if you are having a muscle spasm and tightness in your neck.  · If you have been prescribed a medicine to stop a migraine headache, use this at the first warning sign of the headache for best results. First signs may be an aura or pain.  · If you need to take medicine often for your migraine, talk with your healthcare provider about other ways to prevent your headaches.  Follow-up care  Follow up with your healthcare provider, or as advised. Talk with your provider if you have frequent headaches. He or she can figure out a treatment plan. Ask if you can have medicine to take at home the next time you get a bad headache. This may keep you from having to visit the emergency department in the future. You may need to see a headache specialist (neurologist) if you continue to have headaches.  When to seek medical advice  Call your healthcare provider right away if any of these occur:  · Your head pain gets worse, or doesnt get better within 24 hours  · You cant keep liquids down (repeated vomiting)  · Pain in your sinuses, ears, or  throat  · Fever of 100.4º F (38º C) or higher, or as directed by your healthcare provider  · Stiff neck  · Extreme drowsiness, confusion, or fainting  · Dizziness, or dizziness with spinning sensation (vertigo)  · Weakness in an arm or leg, or on one side of your face  · Difficulty talking or seeing  Date Last Reviewed: 8/1/2016 © 2000-2017 [employer]. [address] [place] [address], [place] [address]. All rights reserved. This information is not intended as a substitute for professional medical care. Always follow your healthcare professional's instructions.        Shingles (Herpes Zoster)     Talk to your healthcare provider about the shingles vaccine.     Shingles is also called herpes zoster. It is a painful skin rash caused by the herpes zoster virus. This is the same virus that causes chickenpox. After a person has chickenpox, the virus remains inactive in the nerve cells. Years later, the virus can become active again and travel to the skin. Most people have shingles only once, but it is possible to have it more than once.  What are the risk factors for shingles?  Anyone who has ever had chickenpox can develop shingles. But your risk is greater if you:  · Are 50 years of age or older  · Have an illness that weakens your immune system, such as HIV/AIDS  · Have cancer, especially Hodgkin disease or lymphoma  · Take medicines that weaken your immune system  What are the symptoms of shingles?  · The first sign of shingles is usually pain, burning, tingling, or itching on one part of your face or body. You may also feel as if you have the flu, with fever and chills.  · A red rash with small blisters appears within a few days. The rash may appear as follows:   ¨ The blisters can occur anywhere, but theyre most common on the back, chest, or abdomen.  ¨ They usually appear on only one side of the body, spreading along the nerve pathway where the virus was inactive.   ¨ The rash can also form around an eye,  along one side of the face or neck, or in the mouth.  ¨ In a few people, usually those with weakened immune systems, shingles appear on more than one part of the body at once.  · After a few days, the blisters become dry and form a crust. The crust falls off in days to weeks. The blisters generally do not leave scars.  How is shingles treated?  For most people, shingles heals on its own in a few weeks. But treatment is recommended to help relieve pain, speed healing, and reduce the risk of complications. Antiviral medicines are prescribed within the first 72 hours of the appearance of the rash. To lessen symptoms:  · Apply ice packs (wrapped in a thin towel) or cool compresses, or soak in a cool bath.  · Use calamine lotion to calm itchy skin.  · Ask your healthcare provider about over-the-counter pain relievers. If your pain is severe, your healthcare provider may prescribe stronger pain medicines.  What are the complications of shingles?  Shingles often goes away with no lasting effects. But some people have serious problems long after the blisters have healed:  · Postherpetic neuralgia. This is the most common complication. It is severe nerve pain at the place where the rash used to be. It can last for months, or even years after you have had shingles. Medicines can be prescribed to help relieve the pain and improve quality of life.  · Bacterial infection. Shingles blisters may become infected with bacteria. Antibiotic medicine is used to treat the infection.  · Eye problems. A person with shingles on the face should see his or her healthcare provider right away. Shingles can cause serious problems with vision, and even blindness.  Very rarely shingles can also lead to pneumonia, hearing problems, brain inflammation, or even death.   When to seek medical care  Contact your healthcare provider if you experience any of the following:  · Symptoms that dont go away with treatment  · A rash or blisters near your  eye  · Increased drainage, fever, or rash after treatment, or severe pain that doesnt go away   How can shingles be prevented?  You can only get shingles if you have had chicken pox in the past. Those who have never had chickenpox can get the virus from you. Although instead of developing shingles, the person may get chickenpox. Until your blisters form scabs, avoid contact with others, especially the following:  · Pregnant women who have never had chickenpox or the vaccine  · Infants who were born early (prematurely) or who had low weight at birth  · People with weak immune system (for example, people receiving chemotherapy for cancer, people who have had organ transplants, or people with HIV infections)     The shingles vaccine  If youre 60 years of age or older, ask your healthcare provider if you should receive the shingles vaccine. The vaccine makes it less likely that you will develop shingles. If you do develop shingles, your symptoms will likely be milder than if you hadnt been vaccinated. Note: Although the vaccine is licensed for people 50 years of age or older, the CDC does not recommend the vaccine for those who are 50 to 59 years old.   Date Last Reviewed: 10/1/2016  © 1466-5688 [employer]. [address] [place] [address], [place] [address]. All rights reserved. This information is not intended as a substitute for professional medical care. Always follow your healthcare professional's instructions.

## 2019-07-29 NOTE — PROGRESS NOTES
"Subjective:       Patient ID: Karen Muhammad is a 59 y.o. female.    Chief Complaint: Headache; Hypertension (lab review ); and Hyperlipidemia    Ms. Jones is a 59-year-old  female who comes for follow-up.  Underlying medical issues of dyslipidemia, hypothyroidism and chronic migraine type of headaches have been noted.  She also has a longstanding history of depressive disorder for which she takes citalopram 40 mg per day.    As far as headaches and migraines are concerned, she has these headaches which are bitemporal and radiates to the frontal forehead.  These occurred about 4 or 5 times a month.  If she has quick enough been taking her Maxalt, she can abort the migraine.  If not, she has to go through the consequences of associated nausea and vomiting till it subsides over the next several hours.    She does always carry couple of extra pills of Maxalt for her.  She has not defined a particular trigger or investigated for her migraines.  Certain strong perfumes and smells do always instigate migraine.  She does not find any correlation between taking any nitrates, cured meats, cheeses or so to mines.  She does not find a dark correlation between heat exposure, light exposure or loud sound exposure.    Today I did also offer her prophylactic treatment with medications like propranolol or verapamil or Topamax but she is not interested in adding another medication to her "list of pills".    As in past, but today again also had discussion about her citalopram and if we could wean her off the medication.  She states that she had had several attempts invading of the medication but not so good results which led to snappiness and irritability.  At this phase and station of her life she feels that she needs to be on the medications.    In past she also had elevated calcium level and hence I repeated a general chemistry and intact PTH.  Her intact PTH was slightly elevated but her calcium level on the " other hand was within normal range.  Thus far she does not have a diagnosis of any conditions like sarcoidosis, malignancy or multiple endocrine neoplasia as.  There is no family history of parathyroid for calcium metabolism disorder thus far.  She does take calcium carbonate supplements.    She does also complain of irritation on her ears skin from a surgical post she had inserted several weeks to months ago in a bid to mitigate her migraines.  This has not really helped her the migraines but this seems to be some irritation.    Migraine    This is a chronic problem. The current episode started more than 1 year ago (age 15 or 16). The problem occurs intermittently. The problem has been waxing and waning. The pain is located in the frontal (either side and also mid forehead) region. The pain does not radiate. The pain quality is similar to prior headaches. The quality of the pain is described as shooting, pulsating and band-like. The pain is at a severity of 7/10. The pain is severe. Associated symptoms include nausea and vomiting. Pertinent negatives include no abdominal pain, coughing, dizziness, eye pain, fever, photophobia, seizures, sinus pressure or tinnitus. Associated symptoms comments: Still and quietness helps her.. The symptoms are aggravated by weather changes (Certain perfumes/smells). She has tried triptans (Helps if taken in time) for the symptoms. The treatment provided moderate relief. Her past medical history is significant for migraine headaches and migraines in the family. There is no history of cancer, cluster headaches, hypertension, immunosuppression, obesity, pseudotumor cerebri, recent head traumas, sinus disease or TMJ.   Depression   Visit Type: follow-up  Patient presents with the following symptoms: anhedonia and choking sensation.  Patient is not experiencing: confusion, dizziness, dry mouth, excessive worry, hypersomnia, hyperventilation, irritability, memory impairment, muscle  tension, nervousness/anxiety, palpitations, shortness of breath, suicidal planning, thoughts of death and weight gain.  Restlessness: hot flashes.  Frequency of symptoms: most days   Severity: mild         Past Medical History:   Diagnosis Date    Anticoagulant long-term use     Anxiety     Depression     Hyperlipidemia     Migraine     Thyroid disease      Social History     Socioeconomic History    Marital status:      Spouse name: Néstor Muhammad    Number of children: 1    Years of education: Not on file    Highest education level: Not on file   Occupational History    Occupation: Inurance Agent      Comment: [employer]   Social Needs    Financial resource strain: Not on file    Food insecurity:     Worry: Not on file     Inability: Not on file    Transportation needs:     Medical: Not on file     Non-medical: Not on file   Tobacco Use    Smoking status: Never Smoker    Smokeless tobacco: Never Used   Substance and Sexual Activity    Alcohol use: Yes     Comment: occasional    Drug use: No    Sexual activity: Yes     Partners: Male   Lifestyle    Physical activity:     Days per week: Not on file     Minutes per session: Not on file    Stress: Not at all   Relationships    Social connections:     Talks on phone: Not on file     Gets together: Not on file     Attends Anabaptist service: Not on file     Active member of club or organization: Not on file     Attends meetings of clubs or organizations: Not on file     Relationship status: Not on file   Other Topics Concern    Not on file   Social History Narrative    Not on file     Past Surgical History:   Procedure Laterality Date    COLONOSCOPY N/A 7/29/2015    Performed by Maxx G[facility]ana [facility] at [facility]    EYE SURGERY  1990    pterygium removed    MOLE REMOVAL  1997    premalignant    WISDOM TOOTH EXTRACTION       Family History   Problem Relation Age of Onset    Nephrolithiasis Father     Heart disease  "Father 69    Arthritis Mother         RA    Diabetes Mother     Heart disease Paternal Uncle     Nephrolithiasis Paternal Grandfather     Heart disease Paternal Grandfather 70    Nephrolithiasis Other         niece    Cancer Neg Hx        Review of Systems   Constitutional: Positive for fatigue (mild fatigue). Negative for activity change, chills, diaphoresis, fever, irritability, unexpected weight change and weight gain.   HENT: Negative for congestion, postnasal drip, sinus pressure, tinnitus, trouble swallowing and voice change.         Irritation in earlobe at the insertion of surgical steel post.   Eyes: Negative for photophobia, pain, discharge and visual disturbance.   Respiratory: Positive for choking. Negative for cough, chest tightness and shortness of breath.    Cardiovascular: Negative for chest pain, palpitations and leg swelling.        Hyperlipidemia   Gastrointestinal: Positive for nausea and vomiting. Negative for abdominal distention, abdominal pain, anal bleeding, constipation and diarrhea.   Endocrine: Negative for cold intolerance, heat intolerance, polydipsia and polyphagia.        Hypoactive thyroid  Mildly elevated calcium   Genitourinary: Negative for difficulty urinating, dysuria, flank pain, frequency and pelvic pain.   Musculoskeletal: Negative for arthralgias and joint swelling.   Skin: Negative for color change, pallor and rash.   Allergic/Immunologic: Negative for environmental allergies, food allergies and immunocompromised state.   Neurological: Negative for dizziness, tremors, seizures, syncope, light-headedness and headaches.   Hematological: Negative for adenopathy. Does not bruise/bleed easily.   Psychiatric/Behavioral: Positive for behavioral problems (mild depressive anxiety) and depression. Negative for agitation, confusion and dysphoric mood. The patient is not nervous/anxious.          Objective:      Blood pressure 134/70, pulse 98, height 5' 4" (1.626 m), weight " 66.2 kg (146 lb). Body mass index is 25.06 kg/m².  Physical Exam   Constitutional: She is oriented to person, place, and time. She appears well-developed and well-nourished. She is cooperative. No distress.   HENT:   Head: Normocephalic and atraumatic.   Slight overgrowth of skin on the surgical post.  In the right ear lobe.   Eyes: Pupils are equal, round, and reactive to light. Conjunctivae, EOM and lids are normal. Lids are everted and swept, no foreign bodies found. Right pupil is round and reactive. Left pupil is round and reactive.   Neck: Trachea normal and normal range of motion. Neck supple.   Cardiovascular: Normal rate, regular rhythm, S1 normal, S2 normal and normal heart sounds.   Pulmonary/Chest: Breath sounds normal.   Abdominal: Soft. Bowel sounds are normal. There is no rigidity and no guarding.   Musculoskeletal: She exhibits no edema.   Lymphadenopathy:     She has no cervical adenopathy.     She has no axillary adenopathy.   Neurological: She is alert and oriented to person, place, and time.   Skin: Skin is warm and dry. Capillary refill takes less than 2 seconds.   Psychiatric: Thought content normal.   Nursing note and vitals reviewed.        Assessment:       1. Migraine without aura and without status migrainosus, not intractable    2. Depression, unspecified depression type    3. Hypercalcemia    4. Need for shingles vaccine           Office Visit on 06/03/2019   Component Date Value Ref Range Status    TSH 07/03/2019 1.59  0.40 - 4.50 mIU/L Final    Glucose 07/03/2019 91  65 - 99 mg/dL Final    BUN, Bld 07/03/2019 13  7 - 25 mg/dL Final    Creatinine 07/03/2019 0.71  0.50 - 1.05 mg/dL Final    eGFR if non African American 07/03/2019 95  > OR = 60 mL/min/1.73m2 Final    eGFR if African American 07/03/2019 110  > OR = 60 mL/min/1.73m2 Final    BUN/Creatinine Ratio 07/03/2019 NOT APPLICABLE  6 - 22 (calc) Final    Sodium 07/03/2019 141  135 - 146 mmol/L Final    Potassium 07/03/2019  4.4  3.5 - 5.3 mmol/L Final    Chloride 07/03/2019 106  98 - 110 mmol/L Final    CO2 07/03/2019 29  20 - 32 mmol/L Final    Calcium 07/03/2019 9.6  8.6 - 10.4 mg/dL Final    Total Protein 07/03/2019 6.7  6.1 - 8.1 g/dL Final    Albumin 07/03/2019 4.2  3.6 - 5.1 g/dL Final    Globulin, Total 07/03/2019 2.5  1.9 - 3.7 g/dL (calc) Final    Albumin/Globulin Ratio 07/03/2019 1.7  1.0 - 2.5 (calc) Final    Total Bilirubin 07/03/2019 0.5  0.2 - 1.2 mg/dL Final    Alkaline Phosphatase 07/03/2019 76  33 - 130 U/L Final    AST 07/03/2019 30  10 - 35 U/L Final    ALT 07/03/2019 31* 6 - 29 U/L Final    PTH, Intact 07/03/2019 78* 14 - 64 pg/mL Final    Vitamin D, 25-OH, Total 07/03/2019 32  30 - 100 ng/mL Final    Calcium, Ion 07/03/2019 5.2  4.8 - 5.6 mg/dL Final    Cholesterol 07/03/2019 162  <200 mg/dL Final    HDL 07/03/2019 54  >50 mg/dL Final    Triglycerides 07/03/2019 85  <150 mg/dL Final    LDL Cholesterol 07/03/2019 90  mg/dL (calc) Final    Hdl/Cholesterol Ratio 07/03/2019 3.0  <5.0 (calc) Final    Non HDL Chol. (LDL+VLDL) 07/03/2019 108  <130 mg/dL (calc) Final         Plan:           Migraine without aura and without status migrainosus, not intractable    Depression, unspecified depression type  -     citalopram (CELEXA) 40 MG tablet; Take 1 tablet (40 mg total) by mouth once daily.  Dispense: 90 tablet; Refill: 2    Hypercalcemia    Need for shingles vaccine  -     varicella-zoster [id number], PF, (SHINGRIX, PF,) 50 mcg/0.5 mL injection; Inject 0.5 mLs into the muscle once. 1st dose now and repeat 2 - 6 months. for 1 dose  Dispense: 0.5 mL; Refill: 1      I had a fairly good discussion with miss melton concerning her migraines.  She does get about 4-5 episodes of migraine a month.  There is no clearly defined instigating factor but perfumes and smells seems to be 1 of them.  If she takes her medication at the initial phases, she is able to abort the migraine and she as a result always make sure that  she has a couple of pills in the back.    I have offered her additional therapy in terms of prophylaxis to prevent migraines but at this time she is happy the way she is.  She does not want to add any other medication.    On previous occasion with did have a discussion concerning perhaps weaning her off antidepressant medication given that at this station of life she is has happy as happy can be.  However, as she has experienced on previous occasions at least 3-4 times-weaning off did not produce good results and to led back to feeling depressed and despondent.    Hence, for the time being will defer the decision for weaning of the medication till uncertain time in future.    She has been refilled on citalopram for 90 days with additional refills  .  She is doing okay on other medications.    Patient also has a stainless  surgical post in her ear.  This seems to be some overgrowth of skin on this post.  Mild irritation but I do not see any true infection.  She has put this post for preventing migraines.    Will also keep an eye on her calcium level though it is normal at this point.  Her intact PTH is slightly elevated.  She does not have any manifestations of significant hypercalcemia .    Seasonal immunizations like influenza have been recommended.  We have also spoken about shingles vaccine and patient does inform me that she had recent episode of skin rash which was diagnosed as shingles.  I have recently seems shingles being diagnosed at the age of 42 also.  She would like to defer this vaccination at this point.    Otherwise all said and done, I will see her back in approximately 6 months time.  Earlier if needed.    Pt seen with Cassy    Spent more than 25 minutes with patient which involved review of his medical conditions, labs, medications and with 50% of time face-to-face discussion about medical problems, management and any applicable changes.      Current Outpatient Medications:     aspirin (ECOTRIN)  81 MG EC tablet, Take 81 mg by mouth once daily., Disp: , Rfl:     atorvastatin (LIPITOR) 20 MG tablet, Take 20 mg by mouth once daily., Disp: , Rfl:     calcium carbonate (OS-MAYA) 500 mg calcium (1,250 mg) tablet, Take 3 tablets by mouth once daily., Disp: , Rfl:     citalopram (CELEXA) 40 MG tablet, Take 1 tablet (40 mg total) by mouth once daily., Disp: 90 tablet, Rfl: 2    glucosamine-chondroitin 500-400 mg tablet, Take 1 tablet by mouth 3 (three) times daily., Disp: , Rfl:     levothyroxine (SYNTHROID) 88 MCG tablet, Take 88 mcg by mouth once daily., Disp: , Rfl:     MULTIVIT WITH CALCIUM,IRON,MIN (WOMEN'S MULTIPLE VITAMINS ORAL), Take by mouth., Disp: , Rfl:     naproxen (NAPROSYN) 500 MG tablet, 1 tablet daily as needed., Disp: , Rfl: 10    rizatriptan (MAXALT) 10 MG tablet, Take 10 mg by mouth as needed for Migraine., Disp: , Rfl:     varicella-zoster gE-AS01B, PF, (SHINGRIX, PF,) 50 mcg/0.5 mL injection, Inject 0.5 mLs into the muscle once. 1st dose now and repeat 2 - 6 months. for 1 dose, Disp: 0.5 mL, Rfl: 1

## 2019-09-23 DIAGNOSIS — N95.9 MENOPAUSAL PROBLEM: Primary | ICD-10-CM

## 2019-09-25 ENCOUNTER — HOSPITAL ENCOUNTER (OUTPATIENT)
Dept: RADIOLOGY | Facility: HOSPITAL | Age: 59
Discharge: HOME | End: 2019-09-25
Attending: INTERNAL MEDICINE
Payer: COMMERCIAL

## 2019-09-25 DIAGNOSIS — N95.9 MENOPAUSAL PROBLEM: ICD-10-CM

## 2019-09-25 PROCEDURE — 77080 DXA BONE DENSITY AXIAL: CPT | Mod: TC,PO

## 2019-12-03 ENCOUNTER — OFFICE VISIT (OUTPATIENT)
Dept: FAMILY MEDICINE | Facility: CLINIC | Age: 59
End: 2019-12-03
Payer: COMMERCIAL

## 2019-12-03 ENCOUNTER — LAB VISIT (OUTPATIENT)
Dept: LAB | Facility: HOSPITAL | Age: 59
End: 2019-12-03
Attending: FAMILY MEDICINE
Payer: COMMERCIAL

## 2019-12-03 VITALS
SYSTOLIC BLOOD PRESSURE: 120 MMHG | RESPIRATION RATE: 12 BRPM | BODY MASS INDEX: 25.93 KG/M2 | HEIGHT: 64 IN | WEIGHT: 151.88 LBS | HEART RATE: 71 BPM | OXYGEN SATURATION: 98 % | DIASTOLIC BLOOD PRESSURE: 70 MMHG | TEMPERATURE: 98 F

## 2019-12-03 DIAGNOSIS — Z23 FLU VACCINE NEED: ICD-10-CM

## 2019-12-03 DIAGNOSIS — L08.9: ICD-10-CM

## 2019-12-03 DIAGNOSIS — E78.5 HYPERLIPIDEMIA, UNSPECIFIED HYPERLIPIDEMIA TYPE: ICD-10-CM

## 2019-12-03 DIAGNOSIS — E78.5 HYPERLIPIDEMIA, UNSPECIFIED HYPERLIPIDEMIA TYPE: Primary | ICD-10-CM

## 2019-12-03 DIAGNOSIS — E06.3 HASHIMOTO'S THYROIDITIS: ICD-10-CM

## 2019-12-03 DIAGNOSIS — R79.89 ELEVATED PTHRP LEVEL: ICD-10-CM

## 2019-12-03 DIAGNOSIS — E83.52 HYPERCALCEMIA: ICD-10-CM

## 2019-12-03 DIAGNOSIS — B07.0 PLANTAR WART OF RIGHT FOOT: ICD-10-CM

## 2019-12-03 DIAGNOSIS — S00.451A: ICD-10-CM

## 2019-12-03 PROBLEM — Z53.21 PATIENT LEFT WITHOUT BEING SEEN: Status: RESOLVED | Noted: 2019-07-08 | Resolved: 2019-12-03

## 2019-12-03 LAB
ALBUMIN SERPL BCP-MCNC: 4.1 G/DL (ref 3.5–5.2)
ALP SERPL-CCNC: 87 U/L (ref 55–135)
ALT SERPL W/O P-5'-P-CCNC: 30 U/L (ref 10–44)
ANION GAP SERPL CALC-SCNC: 8 MMOL/L (ref 8–16)
AST SERPL-CCNC: 24 U/L (ref 10–40)
BASOPHILS # BLD AUTO: 0.03 K/UL (ref 0–0.2)
BASOPHILS NFR BLD: 0.6 % (ref 0–1.9)
BILIRUB SERPL-MCNC: 0.4 MG/DL (ref 0.1–1)
BUN SERPL-MCNC: 14 MG/DL (ref 6–20)
CALCIUM SERPL-MCNC: 10 MG/DL (ref 8.7–10.5)
CHLORIDE SERPL-SCNC: 106 MMOL/L (ref 95–110)
CO2 SERPL-SCNC: 29 MMOL/L (ref 23–29)
CREAT SERPL-MCNC: 0.7 MG/DL (ref 0.5–1.4)
DIFFERENTIAL METHOD: NORMAL
EOSINOPHIL # BLD AUTO: 0.2 K/UL (ref 0–0.5)
EOSINOPHIL NFR BLD: 4.2 % (ref 0–8)
ERYTHROCYTE [DISTWIDTH] IN BLOOD BY AUTOMATED COUNT: 13.2 % (ref 11.5–14.5)
EST. GFR  (AFRICAN AMERICAN): >60 ML/MIN/1.73 M^2
EST. GFR  (NON AFRICAN AMERICAN): >60 ML/MIN/1.73 M^2
GLUCOSE SERPL-MCNC: 79 MG/DL (ref 70–110)
HCT VFR BLD AUTO: 47.2 % (ref 37–48.5)
HGB BLD-MCNC: 15.2 G/DL (ref 12–16)
IMM GRANULOCYTES # BLD AUTO: 0.01 K/UL (ref 0–0.04)
IMM GRANULOCYTES NFR BLD AUTO: 0.2 % (ref 0–0.5)
LYMPHOCYTES # BLD AUTO: 1.9 K/UL (ref 1–4.8)
LYMPHOCYTES NFR BLD: 40.6 % (ref 18–48)
MCH RBC QN AUTO: 29.5 PG (ref 27–31)
MCHC RBC AUTO-ENTMCNC: 32.2 G/DL (ref 32–36)
MCV RBC AUTO: 92 FL (ref 82–98)
MONOCYTES # BLD AUTO: 0.6 K/UL (ref 0.3–1)
MONOCYTES NFR BLD: 12.7 % (ref 4–15)
NEUTROPHILS # BLD AUTO: 2 K/UL (ref 1.8–7.7)
NEUTROPHILS NFR BLD: 41.7 % (ref 38–73)
NRBC BLD-RTO: 0 /100 WBC
PLATELET # BLD AUTO: 188 K/UL (ref 150–350)
PMV BLD AUTO: 11.9 FL (ref 9.2–12.9)
POTASSIUM SERPL-SCNC: 4.8 MMOL/L (ref 3.5–5.1)
PROT SERPL-MCNC: 7.6 G/DL (ref 6–8.4)
RBC # BLD AUTO: 5.15 M/UL (ref 4–5.4)
SODIUM SERPL-SCNC: 143 MMOL/L (ref 136–145)
WBC # BLD AUTO: 4.71 K/UL (ref 3.9–12.7)

## 2019-12-03 PROCEDURE — 83970 ASSAY OF PARATHORMONE: CPT

## 2019-12-03 PROCEDURE — 99214 PR OFFICE/OUTPT VISIT, EST, LEVL IV, 30-39 MIN: ICD-10-PCS | Mod: S$GLB,,, | Performed by: FAMILY MEDICINE

## 2019-12-03 PROCEDURE — 85025 COMPLETE CBC W/AUTO DIFF WBC: CPT

## 2019-12-03 PROCEDURE — 99999 PR PBB SHADOW E&M-EST. PATIENT-LVL V: ICD-10-PCS | Mod: PBBFAC,,, | Performed by: FAMILY MEDICINE

## 2019-12-03 PROCEDURE — 99999 PR PBB SHADOW E&M-EST. PATIENT-LVL V: CPT | Mod: PBBFAC,,, | Performed by: FAMILY MEDICINE

## 2019-12-03 PROCEDURE — 36415 COLL VENOUS BLD VENIPUNCTURE: CPT | Mod: PO

## 2019-12-03 PROCEDURE — 99214 OFFICE O/P EST MOD 30 MIN: CPT | Mod: S$GLB,,, | Performed by: FAMILY MEDICINE

## 2019-12-03 PROCEDURE — 3008F BODY MASS INDEX DOCD: CPT | Mod: CPTII,S$GLB,, | Performed by: FAMILY MEDICINE

## 2019-12-03 PROCEDURE — 82306 VITAMIN D 25 HYDROXY: CPT

## 2019-12-03 PROCEDURE — 3008F PR BODY MASS INDEX (BMI) DOCUMENTED: ICD-10-PCS | Mod: CPTII,S$GLB,, | Performed by: FAMILY MEDICINE

## 2019-12-03 PROCEDURE — 80053 COMPREHEN METABOLIC PANEL: CPT

## 2019-12-03 RX ORDER — SULFAMETHOXAZOLE AND TRIMETHOPRIM 800; 160 MG/1; MG/1
1 TABLET ORAL 2 TIMES DAILY
Qty: 20 TABLET | Refills: 0 | Status: SHIPPED | OUTPATIENT
Start: 2019-12-03 | End: 2020-12-07 | Stop reason: ALTCHOICE

## 2019-12-03 RX ORDER — MUPIROCIN 20 MG/G
OINTMENT TOPICAL 3 TIMES DAILY
Qty: 30 G | Refills: 0 | Status: SHIPPED | OUTPATIENT
Start: 2019-12-03 | End: 2020-05-07

## 2019-12-03 NOTE — PROGRESS NOTES
Subjective:       Patient ID: Karen Muhammad is a 59 y.o. female.    Chief Complaint: Establish Care    HPI  Review of Systems   Constitutional: Negative for activity change and unexpected weight change.   HENT: Negative for hearing loss, rhinorrhea and trouble swallowing.    Eyes: Negative for discharge and visual disturbance.   Respiratory: Negative for chest tightness and wheezing.    Cardiovascular: Negative for chest pain and palpitations.   Gastrointestinal: Negative for blood in stool, constipation, diarrhea and vomiting.   Endocrine: Negative for polydipsia and polyuria.   Genitourinary: Negative for difficulty urinating, dysuria, hematuria and menstrual problem.   Musculoskeletal: Negative for arthralgias, joint swelling and neck pain.   Neurological: Negative for weakness and headaches.   Psychiatric/Behavioral: Negative for confusion and dysphoric mood.       Patient Active Problem List   Diagnosis    Hyperlipidemia    Migraine    Hashimoto's thyroiditis    Anxiety    Depression    Rectal bleeding    Post herpetic neuralgia    Hypercalcemia     Patient is here to establish care     Right ear daith piercing 1/19 to help migraines. Has been cleaning it daily but it is tender still    Dr. Beaulieu Endocrine monitoring thyroid- normal labs 3 months ago    Had recent pth elevation and 1 lft elevated. Normal calcium.  No h/o issues with PTH or calcium known    HPL -controlled with lipitor. Last labs at goal 7/19    Has callous that is painful ball of right foot  Objective:      Physical Exam   Constitutional: She is oriented to person, place, and time. She appears well-developed and well-nourished.   HENT:   Ears:    Cardiovascular: Normal rate, regular rhythm and normal heart sounds.   Pulmonary/Chest: Effort normal and breath sounds normal.   Musculoskeletal: She exhibits no edema.   Neurological: She is alert and oriented to person, place, and time.   Skin: Skin is warm and dry.   Psychiatric:  She has a normal mood and affect.   Nursing note and vitals reviewed.      Assessment:       1. Hyperlipidemia, unspecified hyperlipidemia type    2. Hypercalcemia    3. Hashimoto's thyroiditis    4. Flu vaccine need    5. Elevated PTHrP level    6. Superficial foreign body of right ear without major open wound but with infection, initial encounter    7. Plantar wart of right foot        Plan:         1. Hyperlipidemia, unspecified hyperlipidemia type  Controlled on current medications.  Continue current medications.    - CBC auto differential; Future  - Comprehensive metabolic panel; Future    2. Hypercalcemia  Screen and treat as indicated:      3. Hashimoto's thyroiditis  Cont endocrine monitoring    4. Flu vaccine need  done    5. Elevated PTHrP level  Screen and treat as indicated:    - Vitamin D; Future  - PTH, intact; Future    6. Superficial foreign body of right ear without major open wound but with infection, initial encounter  Treat  - sulfamethoxazole-trimethoprim 800-160mg (BACTRIM DS) 800-160 mg Tab; Take 1 tablet by mouth 2 (two) times daily.  Dispense: 20 tablet; Refill: 0  - mupirocin (BACTROBAN) 2 % ointment; Apply topically 3 (three) times daily.  Dispense: 30 g; Refill: 0  - Ambulatory referral to ENT  Cleanse wound twice daily.  Apply antibiotic ointment and sterile bandage twice daily and as needed.  Monitor for increased pain, pus, redness and swelling.  If occurs then return to clinic or go to the emergency room if clinic is closed for a recheck.      7. Plantar wart of right foot  Refer for eval and treat  - Ambulatory referral to Podiatry        Time spent with patient: 20 minutes    Patient with be reevaluated in 6 months or sooner prn    Greater than 50% of this visit was spent counseling as described in above documentation:Yes

## 2019-12-04 LAB
25(OH)D3+25(OH)D2 SERPL-MCNC: 54 NG/ML (ref 30–96)
PTH-INTACT SERPL-MCNC: 85 PG/ML (ref 9–77)

## 2019-12-26 PROBLEM — R79.89 ELEVATED PARATHYROID HORMONE: Status: ACTIVE | Noted: 2019-12-26

## 2019-12-27 ENCOUNTER — TELEPHONE (OUTPATIENT)
Dept: FAMILY MEDICINE | Facility: CLINIC | Age: 59
End: 2019-12-27

## 2019-12-27 DIAGNOSIS — E83.52 HYPERCALCEMIA: ICD-10-CM

## 2019-12-27 DIAGNOSIS — R79.89 ELEVATED PARATHYROID HORMONE: Primary | ICD-10-CM

## 2019-12-27 NOTE — TELEPHONE ENCOUNTER
----- Message from Cristina Barcenas MD sent at 12/26/2019  6:45 PM CST -----  Notify patient:  Parathyroid hormone remains elevated but vitamin D is normal. This could signify overproduction of this hormone from a primary tumor of the parathyroid gland.  This needs further endocrine work up. Would she like us to fax this to Dr. Beaulieu and make an appt with her or would she like to see one of our endocrine specialist? Otherwise I have reviewed your labs and the following are in the normal or acceptable range including: blood sugar, kidney function, liver function and blood cell counts.

## 2019-12-27 NOTE — TELEPHONE ENCOUNTER
Lab results and recommendations have been explained to patient and understanding has been verbalized.   Patient wishes to see Dr Beaulieu for this issue since she is already a patient of hers. Labs have been printed and faxed to Dr Beaulieu. Please place new referral to Dr Beaulieu with appropriate diagnosis.

## 2019-12-29 ENCOUNTER — HOSPITAL ENCOUNTER (EMERGENCY)
Facility: HOSPITAL | Age: 59
Discharge: HOME | End: 2019-12-29
Attending: EMERGENCY MEDICINE
Payer: COMMERCIAL

## 2019-12-29 ENCOUNTER — PATIENT MESSAGE (OUTPATIENT)
Dept: FAMILY MEDICINE | Facility: CLINIC | Age: 59
End: 2019-12-29

## 2019-12-29 VITALS
DIASTOLIC BLOOD PRESSURE: 72 MMHG | TEMPERATURE: 98 F | RESPIRATION RATE: 18 BRPM | WEIGHT: 130 LBS | SYSTOLIC BLOOD PRESSURE: 113 MMHG | BODY MASS INDEX: 23.04 KG/M2 | HEART RATE: 58 BPM | OXYGEN SATURATION: 99 % | HEIGHT: 63 IN

## 2019-12-29 DIAGNOSIS — G43.909 MIGRAINE WITHOUT STATUS MIGRAINOSUS, NOT INTRACTABLE, UNSPECIFIED MIGRAINE TYPE: Primary | ICD-10-CM

## 2019-12-29 LAB
ALBUMIN SERPL BCP-MCNC: 4 G/DL (ref 3.5–5.2)
ALP SERPL-CCNC: 57 U/L (ref 55–135)
ALT SERPL W/O P-5'-P-CCNC: 31 U/L (ref 10–44)
ANION GAP SERPL CALC-SCNC: 8 MMOL/L (ref 8–16)
AST SERPL-CCNC: 26 U/L (ref 10–40)
BASOPHILS # BLD AUTO: 0.05 K/UL (ref 0–0.2)
BASOPHILS NFR BLD: 0.9 % (ref 0–1.9)
BILIRUB SERPL-MCNC: 0.8 MG/DL (ref 0.1–1)
BUN SERPL-MCNC: 13 MG/DL (ref 6–20)
CALCIUM SERPL-MCNC: 9.5 MG/DL (ref 8.7–10.5)
CHLORIDE SERPL-SCNC: 104 MMOL/L (ref 95–110)
CO2 SERPL-SCNC: 28 MMOL/L (ref 23–29)
CREAT SERPL-MCNC: 0.8 MG/DL (ref 0.5–1.4)
DIFFERENTIAL METHOD: ABNORMAL
EOSINOPHIL # BLD AUTO: 0.2 K/UL (ref 0–0.5)
EOSINOPHIL NFR BLD: 4.1 % (ref 0–8)
ERYTHROCYTE [DISTWIDTH] IN BLOOD BY AUTOMATED COUNT: 13.2 % (ref 11.5–14.5)
EST. GFR  (AFRICAN AMERICAN): >60 ML/MIN/1.73 M^2
EST. GFR  (NON AFRICAN AMERICAN): >60 ML/MIN/1.73 M^2
GLUCOSE SERPL-MCNC: 111 MG/DL (ref 70–110)
HCT VFR BLD AUTO: 46.1 % (ref 37–48.5)
HGB BLD-MCNC: 14.7 G/DL (ref 12–16)
IMM GRANULOCYTES # BLD AUTO: 0.01 K/UL (ref 0–0.04)
IMM GRANULOCYTES NFR BLD AUTO: 0.2 % (ref 0–0.5)
LYMPHOCYTES # BLD AUTO: 1.1 K/UL (ref 1–4.8)
LYMPHOCYTES NFR BLD: 19.4 % (ref 18–48)
MCH RBC QN AUTO: 29.9 PG (ref 27–31)
MCHC RBC AUTO-ENTMCNC: 31.9 G/DL (ref 32–36)
MCV RBC AUTO: 94 FL (ref 82–98)
MONOCYTES # BLD AUTO: 0.5 K/UL (ref 0.3–1)
MONOCYTES NFR BLD: 9.1 % (ref 4–15)
NEUTROPHILS # BLD AUTO: 3.6 K/UL (ref 1.8–7.7)
NEUTROPHILS NFR BLD: 66.3 % (ref 38–73)
NRBC BLD-RTO: 0 /100 WBC
PLATELET # BLD AUTO: 125 K/UL (ref 150–350)
PMV BLD AUTO: 10.8 FL (ref 9.2–12.9)
POTASSIUM SERPL-SCNC: 4.3 MMOL/L (ref 3.5–5.1)
PROT SERPL-MCNC: 7.5 G/DL (ref 6–8.4)
RBC # BLD AUTO: 4.92 M/UL (ref 4–5.4)
SODIUM SERPL-SCNC: 140 MMOL/L (ref 136–145)
WBC # BLD AUTO: 5.41 K/UL (ref 3.9–12.7)

## 2019-12-29 PROCEDURE — 80053 COMPREHEN METABOLIC PANEL: CPT

## 2019-12-29 PROCEDURE — 96361 HYDRATE IV INFUSION ADD-ON: CPT

## 2019-12-29 PROCEDURE — 63600175 PHARM REV CODE 636 W HCPCS: Performed by: EMERGENCY MEDICINE

## 2019-12-29 PROCEDURE — 96374 THER/PROPH/DIAG INJ IV PUSH: CPT

## 2019-12-29 PROCEDURE — 96375 TX/PRO/DX INJ NEW DRUG ADDON: CPT

## 2019-12-29 PROCEDURE — 85025 COMPLETE CBC W/AUTO DIFF WBC: CPT

## 2019-12-29 PROCEDURE — 99284 EMERGENCY DEPT VISIT MOD MDM: CPT | Mod: 25

## 2019-12-29 RX ORDER — METOCLOPRAMIDE HYDROCHLORIDE 5 MG/ML
10 INJECTION INTRAMUSCULAR; INTRAVENOUS
Status: COMPLETED | OUTPATIENT
Start: 2019-12-29 | End: 2019-12-29

## 2019-12-29 RX ORDER — ONDANSETRON 2 MG/ML
4 INJECTION INTRAMUSCULAR; INTRAVENOUS
Status: COMPLETED | OUTPATIENT
Start: 2019-12-29 | End: 2019-12-29

## 2019-12-29 RX ORDER — KETOROLAC TROMETHAMINE 30 MG/ML
30 INJECTION, SOLUTION INTRAMUSCULAR; INTRAVENOUS
Status: COMPLETED | OUTPATIENT
Start: 2019-12-29 | End: 2019-12-29

## 2019-12-29 RX ORDER — BUTALBITAL, ACETAMINOPHEN AND CAFFEINE 50; 325; 40 MG/1; MG/1; MG/1
1 TABLET ORAL EVERY 4 HOURS PRN
Qty: 20 TABLET | Refills: 0 | Status: SHIPPED | OUTPATIENT
Start: 2019-12-29 | End: 2020-01-28

## 2019-12-29 RX ORDER — DIPHENHYDRAMINE HYDROCHLORIDE 50 MG/ML
25 INJECTION INTRAMUSCULAR; INTRAVENOUS
Status: COMPLETED | OUTPATIENT
Start: 2019-12-29 | End: 2019-12-29

## 2019-12-29 RX ORDER — HYDROMORPHONE HYDROCHLORIDE 1 MG/ML
1 INJECTION, SOLUTION INTRAMUSCULAR; INTRAVENOUS; SUBCUTANEOUS
Status: COMPLETED | OUTPATIENT
Start: 2019-12-29 | End: 2019-12-29

## 2019-12-29 RX ADMIN — SODIUM CHLORIDE, SODIUM LACTATE, POTASSIUM CHLORIDE, AND CALCIUM CHLORIDE 1000 ML: .6; .31; .03; .02 INJECTION, SOLUTION INTRAVENOUS at 05:12

## 2019-12-29 RX ADMIN — KETOROLAC TROMETHAMINE 30 MG: 30 INJECTION, SOLUTION INTRAMUSCULAR at 07:12

## 2019-12-29 RX ADMIN — ONDANSETRON HYDROCHLORIDE 4 MG: 2 INJECTION, SOLUTION INTRAMUSCULAR; INTRAVENOUS at 08:12

## 2019-12-29 RX ADMIN — DIPHENHYDRAMINE HYDROCHLORIDE 25 MG: 50 INJECTION INTRAMUSCULAR; INTRAVENOUS at 05:12

## 2019-12-29 RX ADMIN — HYDROMORPHONE HYDROCHLORIDE 1 MG: 1 INJECTION, SOLUTION INTRAMUSCULAR; INTRAVENOUS; SUBCUTANEOUS at 08:12

## 2019-12-29 RX ADMIN — SODIUM CHLORIDE, SODIUM LACTATE, POTASSIUM CHLORIDE, AND CALCIUM CHLORIDE 1000 ML: .6; .31; .03; .02 INJECTION, SOLUTION INTRAVENOUS at 07:12

## 2019-12-29 RX ADMIN — METOCLOPRAMIDE 10 MG: 5 INJECTION, SOLUTION INTRAMUSCULAR; INTRAVENOUS at 05:12

## 2019-12-29 NOTE — TELEPHONE ENCOUNTER
Notified patient via patient portal. Requested to know when is the best time to make her appointment. Awaiting response.

## 2019-12-30 NOTE — ED PROVIDER NOTES
Encounter Date: 12/29/2019       History     Chief Complaint   Patient presents with    Headache     ONSET FRIDAY    Chills     59-year-old with history of anxiety, depression, hyperlipidemia, migraine headaches.  Patient presents emergency to the emergency department with complaint by temporal frontal headache which occurred over the last 2 days.  Patient states associated with nausea.  She denies fever, no blurred vision, no aura.  Patient states that usually she has episodic episodes of headache. This is her usual type headache but does feel as if her pain is worse than normal. She denies any paresthesia, no focal neuro deficits.        Review of patient's allergies indicates:  No Known Allergies  Past Medical History:   Diagnosis Date    Anticoagulant long-term use     Anxiety     Depression     Hyperlipidemia     Migraine     Thyroid disease      Past Surgical History:   Procedure Laterality Date    EYE SURGERY  1990    pterygium removed    MOLE REMOVAL  1997    premalignant    WISDOM TOOTH EXTRACTION       Family History   Problem Relation Age of Onset    Nephrolithiasis Father     Heart disease Father 69    Arthritis Mother         RA    Diabetes Mother     Heart disease Paternal Uncle     Nephrolithiasis Paternal Grandfather     Heart disease Paternal Grandfather 70    Nephrolithiasis Other         niece    Cancer Neg Hx      Social History     Tobacco Use    Smoking status: Never Smoker    Smokeless tobacco: Never Used   Substance Use Topics    Alcohol use: Yes     Frequency: Never     Drinks per session: 1 or 2     Binge frequency: Never     Comment: occasional    Drug use: No     Review of Systems   Constitutional: Negative for fever.   HENT: Negative for sore throat.    Respiratory: Negative for shortness of breath.    Cardiovascular: Negative for chest pain.   Gastrointestinal: Negative for nausea.   Genitourinary: Negative for dysuria.   Musculoskeletal: Negative for back pain.    Skin: Negative for rash.   Neurological: Positive for headaches. Negative for dizziness, seizures, syncope, speech difficulty, weakness and numbness.   Hematological: Does not bruise/bleed easily.       Physical Exam     Initial Vitals [12/29/19 1552]   BP Pulse Resp Temp SpO2   129/70 70 18 97.6 °F (36.4 °C) 100 %      MAP       --         Physical Exam    Nursing note and vitals reviewed.  Constitutional: She appears well-developed and well-nourished.   HENT:   Head: Normocephalic and atraumatic.   Nose: Nose normal.   Mouth/Throat: Oropharynx is clear and moist.   Eyes: Conjunctivae and EOM are normal. Pupils are equal, round, and reactive to light.   Neck: Normal range of motion. Neck supple. No thyromegaly present. No tracheal deviation present.   Cardiovascular: Normal rate, regular rhythm, normal heart sounds and intact distal pulses. Exam reveals no gallop and no friction rub.    No murmur heard.  Pulmonary/Chest: Breath sounds normal. No stridor. No respiratory distress.   Abdominal: Soft. Bowel sounds are normal. She exhibits no mass. There is no rebound and no guarding.   Musculoskeletal: Normal range of motion. She exhibits no edema.   Lymphadenopathy:     She has no cervical adenopathy.   Neurological: She is alert and oriented to person, place, and time. She has normal strength and normal reflexes. GCS score is 15. GCS eye subscore is 4. GCS verbal subscore is 5. GCS motor subscore is 6.   Skin: Skin is warm and dry. Capillary refill takes less than 2 seconds.   Psychiatric: She has a normal mood and affect.         ED Course   Procedures  Labs Reviewed   CBC W/ AUTO DIFFERENTIAL - Abnormal; Notable for the following components:       Result Value    Mean Corpuscular Hemoglobin Conc 31.9 (*)     Platelets 125 (*)     All other components within normal limits   COMPREHENSIVE METABOLIC PANEL - Abnormal; Notable for the following components:    Glucose 111 (*)     All other components within normal  limits   URINALYSIS          Imaging Results          CT Head Without Contrast (Final result)  Result time 12/29/19 18:06:34    Final result by Jessie Espinosa MD (12/29/19 18:06:34)                 Impression:      1. Normal CT appearance of the brain.      Electronically signed by: Jessie Espinosa MD  Date:    12/29/2019  Time:    18:06             Narrative:    EXAMINATION:  CT HEAD WITHOUT CONTRAST    CLINICAL HISTORY:  headache;.    TECHNIQUE:  CMS MANDATED QUALITY DATA - CT RADIATION - 436    All CT scans at this facility utilize dose modulation, iterative reconstruction, and/or weight based dosing when appropriate to reduce radiation dose to as low as reasonably achievable.    Non infusion images were obtained from the skull base to the vertex.    COMPARISON:  None.    FINDINGS:  There is no evidence of intracranial mass, hemorrhage, or midline shift.  The ventricles and sulci are within normal limits.  There are no pathologic extra-axial fluid collections.    There is no evidence of ischemic change or edema.  Cerebellum and brainstem are unremarkable.    The calvarium is intact.                                 Medical Decision Making:   Initial Assessment:   59-year-old female here with complaint of migraine headache over the last 48 hr without relief.  Differential Diagnosis:   Status migrainosus, tension headache, cluster headache  Clinical Tests:   Lab Tests: Ordered and Reviewed  Radiological Study: Ordered and Reviewed  ED Management:  Patient emergency department given IV hydration, patient was given Reglan, Benadryl, Toradol for acute pain control.  Patient's CT showed no evidence of acute intracranial processes.  Patient did state overall relief with headache. At this time patient will be given follow-up with Neurology to be seen this week.  Patient is to return if problems persist or worsen including worsening headache, fever, or additional concerns.                                 Clinical  Impression:       ICD-10-CM ICD-9-CM   1. Migraine without status migrainosus, not intractable, unspecified migraine type G43.909 346.90                             Nitin Bennett MD  12/29/19 2009       Nitin Bennett MD  12/29/19 2040

## 2020-01-06 NOTE — ADDENDUM NOTE
Addended by: TIMOTHY BARRAGAN on: 12/24/2018 01:15 PM     Modules accepted: Orders     Oriented - self; Oriented - place; Oriented - time

## 2020-05-07 DIAGNOSIS — L08.9: ICD-10-CM

## 2020-05-07 DIAGNOSIS — S00.451A: ICD-10-CM

## 2020-05-07 RX ORDER — MUPIROCIN 20 MG/G
OINTMENT TOPICAL
Qty: 22 G | Refills: 0 | Status: SHIPPED | OUTPATIENT
Start: 2020-05-07 | End: 2021-10-19 | Stop reason: ALTCHOICE

## 2020-07-30 LAB — PAP SMEAR: NORMAL

## 2020-07-31 DIAGNOSIS — N63.32 UNSPECIFIED LUMP IN AXILLARY TAIL OF THE LEFT BREAST: Primary | ICD-10-CM

## 2020-08-25 ENCOUNTER — HOSPITAL ENCOUNTER (OUTPATIENT)
Dept: RADIOLOGY | Facility: HOSPITAL | Age: 60
Discharge: HOME | End: 2020-08-25
Attending: SPECIALIST
Payer: COMMERCIAL

## 2020-08-25 DIAGNOSIS — N63.32 UNSPECIFIED LUMP IN AXILLARY TAIL OF THE LEFT BREAST: ICD-10-CM

## 2020-08-25 PROCEDURE — 76642 ULTRASOUND BREAST LIMITED: CPT | Mod: TC,PO,LT

## 2020-08-25 PROCEDURE — 77066 DX MAMMO INCL CAD BI: CPT | Mod: TC,PO

## 2020-10-19 DIAGNOSIS — F32.A DEPRESSION, UNSPECIFIED DEPRESSION TYPE: ICD-10-CM

## 2020-10-20 RX ORDER — CITALOPRAM 40 MG/1
TABLET, FILM COATED ORAL
Qty: 90 TABLET | Refills: 0 | Status: SHIPPED | OUTPATIENT
Start: 2020-10-20 | End: 2020-12-07 | Stop reason: SDUPTHER

## 2020-10-20 NOTE — TELEPHONE ENCOUNTER
Called and spoke to patient regarding Rx refill and follow up appointment. Appointment scheduled on 12/7/2020 with Dr. Barcenas.

## 2020-12-07 ENCOUNTER — OFFICE VISIT (OUTPATIENT)
Dept: FAMILY MEDICINE | Facility: CLINIC | Age: 60
End: 2020-12-07
Payer: COMMERCIAL

## 2020-12-07 VITALS
SYSTOLIC BLOOD PRESSURE: 110 MMHG | WEIGHT: 139.56 LBS | OXYGEN SATURATION: 96 % | TEMPERATURE: 97 F | HEART RATE: 80 BPM | BODY MASS INDEX: 24.73 KG/M2 | DIASTOLIC BLOOD PRESSURE: 70 MMHG | RESPIRATION RATE: 12 BRPM | HEIGHT: 63 IN

## 2020-12-07 DIAGNOSIS — G43.009 MIGRAINE WITHOUT AURA AND WITHOUT STATUS MIGRAINOSUS, NOT INTRACTABLE: ICD-10-CM

## 2020-12-07 DIAGNOSIS — E83.52 HYPERCALCEMIA: ICD-10-CM

## 2020-12-07 DIAGNOSIS — E78.5 HYPERLIPIDEMIA, UNSPECIFIED HYPERLIPIDEMIA TYPE: Primary | ICD-10-CM

## 2020-12-07 DIAGNOSIS — Z23 FLU VACCINE NEED: ICD-10-CM

## 2020-12-07 DIAGNOSIS — Z23 NEED FOR SHINGLES VACCINE: ICD-10-CM

## 2020-12-07 DIAGNOSIS — R79.89 ELEVATED PARATHYROID HORMONE: ICD-10-CM

## 2020-12-07 DIAGNOSIS — E06.3 HASHIMOTO'S THYROIDITIS: ICD-10-CM

## 2020-12-07 DIAGNOSIS — R73.9 HYPERGLYCEMIA: ICD-10-CM

## 2020-12-07 DIAGNOSIS — L90.0 LICHEN SCLEROSUS: ICD-10-CM

## 2020-12-07 DIAGNOSIS — F32.A DEPRESSION, UNSPECIFIED DEPRESSION TYPE: ICD-10-CM

## 2020-12-07 DIAGNOSIS — N95.2 ATROPHIC VAGINITIS: ICD-10-CM

## 2020-12-07 PROCEDURE — 99999 PR PBB SHADOW E&M-EST. PATIENT-LVL IV: CPT | Mod: PBBFAC,,, | Performed by: FAMILY MEDICINE

## 2020-12-07 PROCEDURE — 99214 PR OFFICE/OUTPT VISIT, EST, LEVL IV, 30-39 MIN: ICD-10-PCS | Mod: S$PBB,,, | Performed by: FAMILY MEDICINE

## 2020-12-07 PROCEDURE — 99214 OFFICE O/P EST MOD 30 MIN: CPT | Mod: S$PBB,,, | Performed by: FAMILY MEDICINE

## 2020-12-07 PROCEDURE — 99999 PR PBB SHADOW E&M-EST. PATIENT-LVL IV: ICD-10-PCS | Mod: PBBFAC,,, | Performed by: FAMILY MEDICINE

## 2020-12-07 RX ORDER — ESTRADIOL 0.1 MG/G
1 CREAM VAGINAL
Qty: 42.5 G | Refills: 3 | Status: SHIPPED | OUTPATIENT
Start: 2020-12-07 | End: 2022-02-21

## 2020-12-07 RX ORDER — LIDOCAINE HYDROCHLORIDE 20 MG/ML
JELLY TOPICAL
Qty: 30 ML | Status: SHIPPED | OUTPATIENT
Start: 2020-12-07 | End: 2022-02-21

## 2020-12-07 RX ORDER — CITALOPRAM 40 MG/1
40 TABLET, FILM COATED ORAL DAILY
Qty: 90 TABLET | Refills: 3 | Status: SHIPPED | OUTPATIENT
Start: 2020-12-07 | End: 2021-06-29

## 2020-12-07 NOTE — PATIENT INSTRUCTIONS
Ask about emgality or aimovig injections    Consider adding propanolol (inderal ) long acting for prevention

## 2020-12-07 NOTE — PROGRESS NOTES
Subjective:       Patient ID: Karen Muhammad is a 60 y.o. female.    Chief Complaint: Annual Exam    HPI  Review of Systems   Constitutional: Negative for fatigue and unexpected weight change.   Respiratory: Negative for chest tightness and shortness of breath.    Cardiovascular: Negative for chest pain, palpitations and leg swelling.   Gastrointestinal: Negative for abdominal pain.   Genitourinary: Positive for vaginal pain. Negative for vaginal bleeding and vaginal discharge.   Musculoskeletal: Negative for arthralgias.   Neurological: Negative for dizziness, syncope, light-headedness and headaches.       Patient Active Problem List   Diagnosis    Hyperlipidemia    Migraine    Hashimoto's thyroiditis    Anxiety    Depression    Rectal bleeding    Post herpetic neuralgia    Hypercalcemia    Elevated parathyroid hormone     Patient is here for a chronic conditions follow up.    Dr. Beaulieu Endocrine monitoring thyroid- normal labs 3 months ago [facility]     Had recent pth elevation and 1 lft elevated. Normal calcium.  No h/o issues with PTH or calcium known    Left breast armpit mass -did mammo and u/s - benign. Dr. Clark    Has migraines 8-9 a month. occ triggers  Neuro Dr. Ayala -neuro for migraines.  Saw 2 months ago. maxalt works . Has not tried new injectables like emgality or aimovig. Has not tried any prevention 8-9 years. Sometimes has known triggers    Has recent diagnosis lichen sclerosis in vulva. Has chronic pain. Using Rx clobetasol or compound cream by gyn but not helping. Dry and irritated.  Has not treied estrogen cream  Objective:      Physical Exam  Vitals signs and nursing note reviewed.   Constitutional:       Appearance: She is well-developed.   Cardiovascular:      Rate and Rhythm: Normal rate and regular rhythm.      Heart sounds: Normal heart sounds.   Pulmonary:      Effort: Pulmonary effort is normal.      Breath sounds: Normal breath sounds.   Skin:     General: Skin is  warm and dry.   Neurological:      Mental Status: She is alert and oriented to person, place, and time.         Assessment:       1. Hyperlipidemia, unspecified hyperlipidemia type    2. Hashimoto's thyroiditis    3. Hypercalcemia    4. Elevated parathyroid hormone    5. Hyperglycemia    6. Need for shingles vaccine    7. Flu vaccine need    8. Depression, unspecified depression type    9. Migraine without aura and without status migrainosus, not intractable    10. Atrophic vaginitis    11. Lichen sclerosus        Plan:         1. Hyperlipidemia, unspecified hyperlipidemia type  Stable condition.  Continue current medications.  Will adjust based on lab findings or if condition changes.      2. Hashimoto's thyroiditis  Stable condition.  Continue current medications.  Will adjust based on lab findings or if condition changes.      3. Hypercalcemia  Cont to monitor and treat as indicated      4. Elevated parathyroid hormone  Review labs and notes endocrine    5. Hyperglycemia   Your blood sugar is borderline high.  This means you are at risk for developing type 2 diabetes mellitus.  To lessen your risk you should exercise regularly, avoid excess carbohydrates and work toward a body mass index of less than 25.        6. Need for shingles vaccine  completed    7. Flu vaccine need  done    8. Depression, unspecified depression type  Controlled on current medications.  Continue current medications.    - citalopram (CELEXA) 40 MG tablet; Take 1 tablet (40 mg total) by mouth once daily.  Dispense: 90 tablet; Refill: 3    9. Migraine without aura and without status migrainosus, not intractable  Cont current mgmt    10. Atrophic vaginitis  treat  - estradioL (ESTRACE) 0.01 % (0.1 mg/gram) vaginal cream; Place 1 g vaginally twice a week.  Dispense: 42.5 g; Refill: 3  - lidocaine HCL 2% (XYLOCAINE) 2 % jelly; Apply topically as needed.  Dispense: 30 mL; Refill: PRN    11. Lichen sclerosus  treat  - estradioL (ESTRACE) 0.01 %  (0.1 mg/gram) vaginal cream; Place 1 g vaginally twice a week.  Dispense: 42.5 g; Refill: 3  - lidocaine HCL 2% (XYLOCAINE) 2 % jelly; Apply topically as needed.  Dispense: 30 mL; Refill: PRN        Time spent with patient: 20 minutes    Patient with be reevaluated in 1 year or sooner prn    Greater than 50% of this visit was spent counseling as described in above documentation:Yes

## 2021-05-06 ENCOUNTER — PATIENT MESSAGE (OUTPATIENT)
Dept: RESEARCH | Facility: HOSPITAL | Age: 61
End: 2021-05-06

## 2021-06-28 ENCOUNTER — TELEPHONE (OUTPATIENT)
Dept: FAMILY MEDICINE | Facility: CLINIC | Age: 61
End: 2021-06-28

## 2021-06-28 DIAGNOSIS — F32.A DEPRESSION, UNSPECIFIED DEPRESSION TYPE: ICD-10-CM

## 2021-06-29 RX ORDER — CITALOPRAM 40 MG/1
TABLET, FILM COATED ORAL
Qty: 90 TABLET | Refills: 1 | Status: SHIPPED | OUTPATIENT
Start: 2021-06-29 | End: 2021-12-08 | Stop reason: SDUPTHER

## 2021-08-06 ENCOUNTER — PATIENT OUTREACH (OUTPATIENT)
Dept: ADMINISTRATIVE | Facility: HOSPITAL | Age: 61
End: 2021-08-06

## 2021-09-23 DIAGNOSIS — M85.88 OTHER SPECIFIED DISORDERS OF BONE DENSITY AND STRUCTURE, OTHER SITE: Primary | ICD-10-CM

## 2021-09-27 ENCOUNTER — HOSPITAL ENCOUNTER (OUTPATIENT)
Dept: RADIOLOGY | Facility: HOSPITAL | Age: 61
Discharge: HOME | End: 2021-09-27
Attending: INTERNAL MEDICINE
Payer: COMMERCIAL

## 2021-09-27 DIAGNOSIS — M85.88 OTHER SPECIFIED DISORDERS OF BONE DENSITY AND STRUCTURE, OTHER SITE: ICD-10-CM

## 2021-09-27 PROCEDURE — 77080 DXA BONE DENSITY AXIAL: CPT | Mod: TC,PO

## 2021-12-08 ENCOUNTER — PATIENT OUTREACH (OUTPATIENT)
Dept: ADMINISTRATIVE | Facility: HOSPITAL | Age: 61
End: 2021-12-08
Payer: COMMERCIAL

## 2021-12-08 ENCOUNTER — OFFICE VISIT (OUTPATIENT)
Dept: FAMILY MEDICINE | Facility: CLINIC | Age: 61
End: 2021-12-08
Payer: COMMERCIAL

## 2021-12-08 VITALS
HEIGHT: 63 IN | DIASTOLIC BLOOD PRESSURE: 68 MMHG | RESPIRATION RATE: 14 BRPM | TEMPERATURE: 98 F | HEART RATE: 93 BPM | SYSTOLIC BLOOD PRESSURE: 110 MMHG | BODY MASS INDEX: 27.73 KG/M2 | WEIGHT: 156.5 LBS | OXYGEN SATURATION: 97 %

## 2021-12-08 DIAGNOSIS — R79.89 ELEVATED PARATHYROID HORMONE: ICD-10-CM

## 2021-12-08 DIAGNOSIS — Z12.31 ENCOUNTER FOR SCREENING MAMMOGRAM FOR MALIGNANT NEOPLASM OF BREAST: ICD-10-CM

## 2021-12-08 DIAGNOSIS — N62 LARGE BREASTS: ICD-10-CM

## 2021-12-08 DIAGNOSIS — E06.3 HASHIMOTO'S THYROIDITIS: ICD-10-CM

## 2021-12-08 DIAGNOSIS — F32.A DEPRESSION, UNSPECIFIED DEPRESSION TYPE: ICD-10-CM

## 2021-12-08 DIAGNOSIS — E83.52 HYPERCALCEMIA: ICD-10-CM

## 2021-12-08 DIAGNOSIS — E78.5 HYPERLIPIDEMIA, UNSPECIFIED HYPERLIPIDEMIA TYPE: ICD-10-CM

## 2021-12-08 DIAGNOSIS — G43.009 MIGRAINE WITHOUT AURA AND WITHOUT STATUS MIGRAINOSUS, NOT INTRACTABLE: ICD-10-CM

## 2021-12-08 DIAGNOSIS — E03.9 HYPOTHYROIDISM, UNSPECIFIED TYPE: Primary | ICD-10-CM

## 2021-12-08 PROCEDURE — 99999 PR PBB SHADOW E&M-EST. PATIENT-LVL IV: CPT | Mod: PBBFAC,,, | Performed by: FAMILY MEDICINE

## 2021-12-08 PROCEDURE — 99214 PR OFFICE/OUTPT VISIT, EST, LEVL IV, 30-39 MIN: ICD-10-PCS | Mod: S$GLB,,, | Performed by: FAMILY MEDICINE

## 2021-12-08 PROCEDURE — 99214 OFFICE O/P EST MOD 30 MIN: CPT | Mod: S$GLB,,, | Performed by: FAMILY MEDICINE

## 2021-12-08 PROCEDURE — 99999 PR PBB SHADOW E&M-EST. PATIENT-LVL IV: ICD-10-PCS | Mod: PBBFAC,,, | Performed by: FAMILY MEDICINE

## 2021-12-08 RX ORDER — CITALOPRAM 40 MG/1
40 TABLET, FILM COATED ORAL DAILY
Qty: 90 TABLET | Refills: 3 | Status: SHIPPED | OUTPATIENT
Start: 2021-12-08 | End: 2022-12-14 | Stop reason: SDUPTHER

## 2021-12-18 ENCOUNTER — PATIENT MESSAGE (OUTPATIENT)
Dept: FAMILY MEDICINE | Facility: CLINIC | Age: 61
End: 2021-12-18
Payer: COMMERCIAL

## 2021-12-21 ENCOUNTER — PATIENT MESSAGE (OUTPATIENT)
Dept: FAMILY MEDICINE | Facility: CLINIC | Age: 61
End: 2021-12-21
Payer: COMMERCIAL

## 2021-12-21 DIAGNOSIS — N62 LARGE BREASTS: Primary | ICD-10-CM

## 2021-12-22 ENCOUNTER — HOSPITAL ENCOUNTER (OUTPATIENT)
Dept: RADIOLOGY | Facility: HOSPITAL | Age: 61
Discharge: HOME | End: 2021-12-22
Attending: FAMILY MEDICINE
Payer: COMMERCIAL

## 2021-12-22 DIAGNOSIS — Z12.31 ENCOUNTER FOR SCREENING MAMMOGRAM FOR MALIGNANT NEOPLASM OF BREAST: ICD-10-CM

## 2021-12-22 PROCEDURE — 77067 MAMMO DIGITAL SCREENING BILAT WITH TOMO: ICD-10-PCS | Mod: 26,,, | Performed by: RADIOLOGY

## 2021-12-22 PROCEDURE — 77067 SCR MAMMO BI INCL CAD: CPT | Mod: TC

## 2021-12-22 PROCEDURE — 77067 SCR MAMMO BI INCL CAD: CPT | Mod: 26,,, | Performed by: RADIOLOGY

## 2021-12-22 PROCEDURE — 77063 BREAST TOMOSYNTHESIS BI: CPT | Mod: 26,,, | Performed by: RADIOLOGY

## 2021-12-22 PROCEDURE — 77063 MAMMO DIGITAL SCREENING BILAT WITH TOMO: ICD-10-PCS | Mod: 26,,, | Performed by: RADIOLOGY

## 2022-01-05 ENCOUNTER — PATIENT MESSAGE (OUTPATIENT)
Dept: FAMILY MEDICINE | Facility: CLINIC | Age: 62
End: 2022-01-05
Payer: COMMERCIAL

## 2022-01-06 ENCOUNTER — TELEPHONE (OUTPATIENT)
Dept: FAMILY MEDICINE | Facility: CLINIC | Age: 62
End: 2022-01-06
Payer: COMMERCIAL

## 2022-01-06 NOTE — TELEPHONE ENCOUNTER
----- Message from Dwayne ANTHONY Khanh sent at 1/6/2022 10:29 AM CST -----  Contact: patient  Type: Needs Medical Advice  Who Called:  patient  Symptoms (please be specific):  n/a  How long has patient had these symptoms:  n/a  Pharmacy name and phone #:  n/a  Best Call Back Number: [phone]  Additional Information: Patient called in and stated she got a message from office and they wanted to know if they letter they have ready for her needed to be faxed.  Patient asked that it be faxed to:    [phone] fax number

## 2022-02-02 ENCOUNTER — TELEPHONE (OUTPATIENT)
Dept: CARDIOLOGY | Facility: CLINIC | Age: 62
End: 2022-02-02
Payer: COMMERCIAL

## 2022-02-02 NOTE — TELEPHONE ENCOUNTER
S/w patient and scheduled her a new patient appointment with Cb and she stated that she is having chest pains.

## 2022-02-02 NOTE — TELEPHONE ENCOUNTER
----- Message from Malcom Guillory sent at 2/2/2022  8:42 AM CST -----  Contact: pt  Pt is wanting to set a NP appt with Christine please call back pt to schedule appt     [phone]

## 2022-02-21 ENCOUNTER — TELEPHONE (OUTPATIENT)
Dept: CARDIOLOGY | Facility: CLINIC | Age: 62
End: 2022-02-21

## 2022-02-21 ENCOUNTER — OFFICE VISIT (OUTPATIENT)
Dept: CARDIOLOGY | Facility: CLINIC | Age: 62
End: 2022-02-21
Payer: COMMERCIAL

## 2022-02-21 VITALS
OXYGEN SATURATION: 97 % | DIASTOLIC BLOOD PRESSURE: 76 MMHG | HEIGHT: 63 IN | WEIGHT: 154 LBS | SYSTOLIC BLOOD PRESSURE: 120 MMHG | HEART RATE: 82 BPM | BODY MASS INDEX: 27.29 KG/M2

## 2022-02-21 DIAGNOSIS — E78.2 MIXED HYPERLIPIDEMIA: ICD-10-CM

## 2022-02-21 DIAGNOSIS — Z86.69 HISTORY OF MIGRAINE: ICD-10-CM

## 2022-02-21 DIAGNOSIS — M94.0 COSTOCHONDRITIS, ACUTE: ICD-10-CM

## 2022-02-21 DIAGNOSIS — R79.89 ELEVATED PARATHYROID HORMONE: ICD-10-CM

## 2022-02-21 DIAGNOSIS — R07.9 CHEST PAIN, UNSPECIFIED TYPE: Primary | ICD-10-CM

## 2022-02-21 DIAGNOSIS — F32.0 CURRENT MILD EPISODE OF MAJOR DEPRESSIVE DISORDER WITHOUT PRIOR EPISODE: ICD-10-CM

## 2022-02-21 DIAGNOSIS — F41.9 ANXIETY: ICD-10-CM

## 2022-02-21 DIAGNOSIS — R07.9 CHEST PAIN, UNSPECIFIED TYPE: ICD-10-CM

## 2022-02-21 DIAGNOSIS — E06.3 HASHIMOTO'S THYROIDITIS: ICD-10-CM

## 2022-02-21 PROCEDURE — 3008F PR BODY MASS INDEX (BMI) DOCUMENTED: ICD-10-PCS | Mod: CPTII,S$GLB,, | Performed by: INTERNAL MEDICINE

## 2022-02-21 PROCEDURE — 99205 PR OFFICE/OUTPT VISIT, NEW, LEVL V, 60-74 MIN: ICD-10-PCS | Mod: S$GLB,,, | Performed by: INTERNAL MEDICINE

## 2022-02-21 PROCEDURE — 99205 OFFICE O/P NEW HI 60 MIN: CPT | Mod: S$GLB,,, | Performed by: INTERNAL MEDICINE

## 2022-02-21 PROCEDURE — 3074F PR MOST RECENT SYSTOLIC BLOOD PRESSURE < 130 MM HG: ICD-10-PCS | Mod: CPTII,S$GLB,, | Performed by: INTERNAL MEDICINE

## 2022-02-21 PROCEDURE — 1160F RVW MEDS BY RX/DR IN RCRD: CPT | Mod: CPTII,S$GLB,, | Performed by: INTERNAL MEDICINE

## 2022-02-21 PROCEDURE — 1159F PR MEDICATION LIST DOCUMENTED IN MEDICAL RECORD: ICD-10-PCS | Mod: CPTII,S$GLB,, | Performed by: INTERNAL MEDICINE

## 2022-02-21 PROCEDURE — 1159F MED LIST DOCD IN RCRD: CPT | Mod: CPTII,S$GLB,, | Performed by: INTERNAL MEDICINE

## 2022-02-21 PROCEDURE — 3078F PR MOST RECENT DIASTOLIC BLOOD PRESSURE < 80 MM HG: ICD-10-PCS | Mod: CPTII,S$GLB,, | Performed by: INTERNAL MEDICINE

## 2022-02-21 PROCEDURE — 3074F SYST BP LT 130 MM HG: CPT | Mod: CPTII,S$GLB,, | Performed by: INTERNAL MEDICINE

## 2022-02-21 PROCEDURE — 1160F PR REVIEW ALL MEDS BY PRESCRIBER/CLIN PHARMACIST DOCUMENTED: ICD-10-PCS | Mod: CPTII,S$GLB,, | Performed by: INTERNAL MEDICINE

## 2022-02-21 PROCEDURE — 3078F DIAST BP <80 MM HG: CPT | Mod: CPTII,S$GLB,, | Performed by: INTERNAL MEDICINE

## 2022-02-21 PROCEDURE — 3008F BODY MASS INDEX DOCD: CPT | Mod: CPTII,S$GLB,, | Performed by: INTERNAL MEDICINE

## 2022-02-21 PROCEDURE — 93000 EKG 12-LEAD: ICD-10-PCS | Mod: S$GLB,,, | Performed by: INTERNAL MEDICINE

## 2022-02-21 PROCEDURE — 93000 ELECTROCARDIOGRAM COMPLETE: CPT | Mod: S$GLB,,, | Performed by: INTERNAL MEDICINE

## 2022-02-21 RX ORDER — PANTOPRAZOLE SODIUM 40 MG/1
40 TABLET, DELAYED RELEASE ORAL DAILY
Qty: 30 TABLET | Refills: 0 | Status: SHIPPED | OUTPATIENT
Start: 2022-02-21 | End: 2022-03-30 | Stop reason: ALTCHOICE

## 2022-02-21 NOTE — PROGRESS NOTES
Subjective:    Patient ID:  Karen Muhammad is a 61 y.o. female     Chief Complaint   Patient presents with    Establish Care    Chest Pain       HPI:  Ms Karen Muhammad is a 61 y.o. female is here for initial consultation.  Patient has been complaining of having chest discomfort for the past 4 months.  And most of her chest pain is when she is not doing anything it comes on at any given time and states this for longer period of time and no aggravating or relieving factors.  It is mostly along the sternal borders.  And her chest pain is not associated with any shortness of breath or nausea vomiting or diaphoresis or loss of consciousness.  She does have chest pain but she also has shoulder pain between the blades intermittently.  Patient denies any shortness breath and dyspnea on exertion denies any dizziness or lightheadedness loss of consciousness denies any palpitations.  Patient does have migraine headaches and she does take Maxalt as needed and she also takes naproxen also as needed.    Review of patient's allergies indicates:  No Known Allergies    Past Medical History:   Diagnosis Date    Anticoagulant long-term use     Anxiety     Depression     Hyperlipidemia     Migraine     Thyroid disease      Past Surgical History:   Procedure Laterality Date    EYE SURGERY  1990    pterygium removed    MOLE REMOVAL  1997    premalignant    WISDOM TOOTH EXTRACTION       Social History     Tobacco Use    Smoking status: Never Smoker    Smokeless tobacco: Never Used   Substance Use Topics    Alcohol use: Yes     Comment: occasional    Drug use: No     Family History   Problem Relation Age of Onset    Nephrolithiasis Father     Heart disease Father 69    Arthritis Mother         RA    Diabetes Mother     Heart disease Paternal Uncle     Nephrolithiasis Paternal Grandfather     Heart disease Paternal Grandfather 70    Nephrolithiasis Other         niece    Cancer Neg Hx         Review of  Systems:   Constitution: Negative for diaphoresis and fever.   HEENT: Negative for nosebleeds.    Cardiovascular:  Positive for chest pain       No dyspnea on exertion       No leg swelling        No palpitations  Respiratory: Negative for shortness of breath and wheezing.    Hematologic/Lymphatic: Negative for bleeding problem. Does not bruise/bleed easily.   Skin: Negative for color change and rash.   Musculoskeletal: Negative for falls and myalgias.   Gastrointestinal: Negative for hematemesis and hematochezia.   Genitourinary: Negative for hematuria.   Neurological: Negative for dizziness and light-headedness.   Psychiatric/Behavioral: Negative for altered mental status and memory loss.          Objective:        Vitals:    02/21/22 1344   BP: 120/76   Pulse: 82       Lab Results   Component Value Date    WBC 5.41 12/29/2019    HGB 14.7 12/29/2019    HCT 46.1 12/29/2019     (L) 12/29/2019    CHOL 162 07/03/2019    TRIG 85 07/03/2019    HDL 54 07/03/2019    ALT 31 12/29/2019    AST 26 12/29/2019     12/29/2019    K 4.3 12/29/2019     12/29/2019    CREATININE 0.8 12/29/2019    BUN 13 12/29/2019    CO2 28 12/29/2019    TSH 1.59 07/03/2019        ECHOCARDIOGRAM RESULTS  No results found for this or any previous visit.        CURRENT/PREVIOUS VISIT EKG  No results found for this or any previous visit.  No valid procedures specified.   No results found for this or any previous visit.      Physical Exam:  CONSTITUTIONAL: No fever, no chills  HEENT: Normocephalic, atraumatic,pupils reactive to light                 NECK:  No JVD no carotid bruit  CVS: S1S2+, RRR, faint systolic murmurs,   LUNGS: Clear  ABDOMEN: Soft, NT, BS+  EXTREMITIES: No cyanosis, edema  : No starr catheter  NEURO: AAO X 3  PSY: Normal affect      Medication List with Changes/Refills   Current Medications    ASPIRIN (ECOTRIN) 81 MG EC TABLET    Take 81 mg by mouth once daily.    ATORVASTATIN (LIPITOR) 20 MG TABLET    Take 20 mg  by mouth once daily.    CITALOPRAM (CELEXA) 40 MG TABLET    Take 1 tablet (40 mg total) by mouth once daily.    GLUCOSAMINE-CHONDROITIN 500-400 MG TABLET    Take 1 tablet by mouth 3 (three) times daily.    KETOCONAZOLE (NIZORAL) 2 % CREAM    Apply bid to rash between and under breasts    LEVOTHYROXINE (SYNTHROID) 88 MCG TABLET    Take 88 mcg by mouth once daily.    MULTIVIT WITH CALCIUM,IRON,MIN (WOMEN'S MULTIPLE VITAMINS ORAL)    Take by mouth.    NAPROXEN (NAPROSYN) 500 MG TABLET    1 tablet daily as needed.    RIZATRIPTAN (MAXALT) 10 MG TABLET    Take 10 mg by mouth as needed for Migraine.   Discontinued Medications    CLOBETASOL 0.05% (TEMOVATE) 0.05 % OINT    Mix with mupirocin ointment and apply qd to affected area x 1 month max    ESTRADIOL (ESTRACE) 0.01 % (0.1 MG/GRAM) VAGINAL CREAM    Place 1 g vaginally twice a week.    LIDOCAINE HCL 2% (XYLOCAINE) 2 % JELLY    Apply topically as needed.    MUPIROCIN (BACTROBAN) 2 % OINTMENT    Mix with clobetasol ointment and apply qd to affected area    TRIAMCINOLONE ACETONIDE 0.1% (KENALOG) 0.1 % CREAM    Apply to irritated rash between and under breasts bid for only 2 wk then stop and call for appt if not cleared             Assessment:       1. Chest pain, unspecified type    2. History of migraine    3. Hashimoto's thyroiditis    4. Elevated parathyroid hormone    5. Mixed hyperlipidemia    6. Current mild episode of major depressive disorder without prior episode    7. Anxiety         Plan:   1. Patient has been having recurrent chest discomfort.  Etiology is unclear at the present time.  It need to rule out coronary artery disease.  2. Reviewed her EKG independently patient is in normal sinus rhythm with borderline nonspecific ST T-wave changes.  With normal intervals.  3. Will schedule her for exercise stress Cardiolite to evaluate for ischemia.  4. Will also do a 2D echocardiogram for LV function ejection fraction and wall motion abnormality  5. Patient is  hypothyroid currently on levothyroxine 88 mcg p.o. q.day.  Her primary physician is following up with her thyroid function tests and adjusting the dose.  6. Patient has taken naproxen intermittently as well as she is on Maxalt and would like to separate if she has any gastric issues.  Will start her on Protonix 40 mg p.o. q.day for 3 weeks.  7. Patient also has significant chest wall tenderness and pain discussed with patient and  at length in regards with different etiologies of chest wall pain.  And possibly she could be having costochondritis.    8. Patient is on Celexa 40 mg p.o. q.day continue the same and her primary physician is following up with her.  9. She is on Lipitor 20 mg p.o. q.day continue the same.  Both her primary physician in her endocrinologist check her cholesterol levels.  10. She is on aspirin 81 mg p.o. q.day continue the same.      Problem List Items Addressed This Visit        Neuro    History of migraine       Psychiatric    Anxiety    Depression       Cardiac/Vascular    Mixed hyperlipidemia       Endocrine    Hashimoto's thyroiditis    Overview     Dr. Beaulieu           Elevated parathyroid hormone       Other    Chest pain - Primary    Relevant Orders    IN OFFICE EKG 12-LEAD (to [place])          No follow-ups on file.

## 2022-02-23 DIAGNOSIS — D84.9 IMMUNOSUPPRESSED STATUS: ICD-10-CM

## 2022-03-16 ENCOUNTER — HOSPITAL ENCOUNTER (OUTPATIENT)
Dept: RADIOLOGY | Facility: HOSPITAL | Age: 62
Discharge: HOME | End: 2022-03-16
Attending: INTERNAL MEDICINE
Payer: COMMERCIAL

## 2022-03-16 ENCOUNTER — CLINICAL SUPPORT (OUTPATIENT)
Dept: CARDIOLOGY | Facility: HOSPITAL | Age: 62
End: 2022-03-16
Attending: INTERNAL MEDICINE
Payer: COMMERCIAL

## 2022-03-16 DIAGNOSIS — R07.9 CHEST PAIN, UNSPECIFIED TYPE: ICD-10-CM

## 2022-03-16 LAB
CV STRESS BASE HR: 70 BPM
DIASTOLIC BLOOD PRESSURE: 85 MMHG
OHS CV CPX 1 MINUTE RECOVERY HEART RATE: 139 BPM
OHS CV CPX 85 PERCENT MAX PREDICTED HEART RATE MALE: 131
OHS CV CPX ESTIMATED METS: 10
OHS CV CPX MAX PREDICTED HEART RATE: 154
OHS CV CPX PATIENT IS FEMALE: 1
OHS CV CPX PATIENT IS MALE: 0
OHS CV CPX PEAK DIASTOLIC BLOOD PRESSURE: 93 MMHG
OHS CV CPX PEAK HEAR RATE: 155 BPM
OHS CV CPX PEAK RATE PRESSURE PRODUCT: NORMAL
OHS CV CPX PEAK SYSTOLIC BLOOD PRESSURE: 187 MMHG
OHS CV CPX PERCENT MAX PREDICTED HEART RATE ACHIEVED: 101
OHS CV CPX RATE PRESSURE PRODUCT PRESENTING: NORMAL
STRESS ECHO POST EXERCISE DUR MIN: 8 MINUTES
SYSTOLIC BLOOD PRESSURE: 147 MMHG

## 2022-03-16 PROCEDURE — 93016 NUCLEAR STRESS TEST (CUPID ONLY): ICD-10-PCS | Mod: ,,, | Performed by: GENERAL PRACTICE

## 2022-03-16 PROCEDURE — 93018 CV STRESS TEST I&R ONLY: CPT | Mod: ,,, | Performed by: GENERAL PRACTICE

## 2022-03-16 PROCEDURE — A9502 TC99M TETROFOSMIN: HCPCS

## 2022-03-16 PROCEDURE — 93017 CV STRESS TEST TRACING ONLY: CPT

## 2022-03-16 PROCEDURE — 93016 CV STRESS TEST SUPVJ ONLY: CPT | Mod: ,,, | Performed by: GENERAL PRACTICE

## 2022-03-16 PROCEDURE — 93018 NUCLEAR STRESS TEST (CUPID ONLY): ICD-10-PCS | Mod: ,,, | Performed by: GENERAL PRACTICE

## 2022-03-21 ENCOUNTER — OFFICE VISIT (OUTPATIENT)
Dept: CARDIOLOGY | Facility: CLINIC | Age: 62
End: 2022-03-21
Payer: COMMERCIAL

## 2022-03-21 VITALS
OXYGEN SATURATION: 98 % | HEIGHT: 63 IN | DIASTOLIC BLOOD PRESSURE: 70 MMHG | BODY MASS INDEX: 27.11 KG/M2 | WEIGHT: 153 LBS | HEART RATE: 82 BPM | SYSTOLIC BLOOD PRESSURE: 111 MMHG

## 2022-03-21 DIAGNOSIS — K21.9 GASTROESOPHAGEAL REFLUX DISEASE, UNSPECIFIED WHETHER ESOPHAGITIS PRESENT: ICD-10-CM

## 2022-03-21 DIAGNOSIS — R07.9 CHEST PAIN, UNSPECIFIED TYPE: Primary | ICD-10-CM

## 2022-03-21 PROCEDURE — 99214 OFFICE O/P EST MOD 30 MIN: CPT | Mod: S$GLB,,, | Performed by: NURSE PRACTITIONER

## 2022-03-21 PROCEDURE — 1160F PR REVIEW ALL MEDS BY PRESCRIBER/CLIN PHARMACIST DOCUMENTED: ICD-10-PCS | Mod: CPTII,S$GLB,, | Performed by: NURSE PRACTITIONER

## 2022-03-21 PROCEDURE — 1160F RVW MEDS BY RX/DR IN RCRD: CPT | Mod: CPTII,S$GLB,, | Performed by: NURSE PRACTITIONER

## 2022-03-21 PROCEDURE — 3078F DIAST BP <80 MM HG: CPT | Mod: CPTII,S$GLB,, | Performed by: NURSE PRACTITIONER

## 2022-03-21 PROCEDURE — 3008F PR BODY MASS INDEX (BMI) DOCUMENTED: ICD-10-PCS | Mod: CPTII,S$GLB,, | Performed by: NURSE PRACTITIONER

## 2022-03-21 PROCEDURE — 3074F SYST BP LT 130 MM HG: CPT | Mod: CPTII,S$GLB,, | Performed by: NURSE PRACTITIONER

## 2022-03-21 PROCEDURE — 3074F PR MOST RECENT SYSTOLIC BLOOD PRESSURE < 130 MM HG: ICD-10-PCS | Mod: CPTII,S$GLB,, | Performed by: NURSE PRACTITIONER

## 2022-03-21 PROCEDURE — 3078F PR MOST RECENT DIASTOLIC BLOOD PRESSURE < 80 MM HG: ICD-10-PCS | Mod: CPTII,S$GLB,, | Performed by: NURSE PRACTITIONER

## 2022-03-21 PROCEDURE — 1159F MED LIST DOCD IN RCRD: CPT | Mod: CPTII,S$GLB,, | Performed by: NURSE PRACTITIONER

## 2022-03-21 PROCEDURE — 1159F PR MEDICATION LIST DOCUMENTED IN MEDICAL RECORD: ICD-10-PCS | Mod: CPTII,S$GLB,, | Performed by: NURSE PRACTITIONER

## 2022-03-21 PROCEDURE — 99214 PR OFFICE/OUTPT VISIT, EST, LEVL IV, 30-39 MIN: ICD-10-PCS | Mod: S$GLB,,, | Performed by: NURSE PRACTITIONER

## 2022-03-21 PROCEDURE — 3008F BODY MASS INDEX DOCD: CPT | Mod: CPTII,S$GLB,, | Performed by: NURSE PRACTITIONER

## 2022-03-21 NOTE — PROGRESS NOTES
Subjective:    Patient ID:  Karen Muhammad is a 61 y.o. female     HPI:  Patient presents today for follow-up appointment.  She reports that she has been feeling much better since she was started on the pantoprazole.  She has no further complaints of any chest discomfort and has been feeling much better.      Review of patient's allergies indicates:  No Known Allergies    Past Medical History:   Diagnosis Date    Anticoagulant long-term use     Anxiety     Depression     Hyperlipidemia     Migraine     Thyroid disease      Past Surgical History:   Procedure Laterality Date    EYE SURGERY  1990    pterygium removed    MOLE REMOVAL  1997    premalignant    WISDOM TOOTH EXTRACTION       Social History     Tobacco Use    Smoking status: Never Smoker    Smokeless tobacco: Never Used   Substance Use Topics    Alcohol use: Yes     Comment: occasional    Drug use: No     Family History   Problem Relation Age of Onset    Nephrolithiasis Father     Heart disease Father 69    Arthritis Mother         RA    Diabetes Mother     Heart disease Paternal Uncle     Nephrolithiasis Paternal Grandfather     Heart disease Paternal Grandfather 70    Nephrolithiasis Other         niece    Cancer Neg Hx         Review of Systems:   Constitution: Negative for diaphoresis and fever.   HEENT: Negative for nosebleeds.    Cardiovascular: Negative for chest pain       No dyspnea on exertion       No leg swelling        No palpitations  Respiratory: Negative for shortness of breath and wheezing.    Hematologic/Lymphatic: Negative for bleeding problem. Does not bruise/bleed easily.   Skin: Negative for color change and rash.   Musculoskeletal: Negative for falls and myalgias.   Gastrointestinal: Negative for hematemesis and hematochezia.   Genitourinary: Negative for hematuria.   Neurological: Negative for dizziness and light-headedness.   Psychiatric/Behavioral: Negative for altered mental status and memory loss.           Objective:        Vitals:    03/21/22 1513   BP: 111/70   Pulse: 82       Lab Results   Component Value Date    WBC 5.41 12/29/2019    HGB 14.7 12/29/2019    HCT 46.1 12/29/2019     (L) 12/29/2019    CHOL 162 07/03/2019    TRIG 85 07/03/2019    HDL 54 07/03/2019    ALT 31 12/29/2019    AST 26 12/29/2019     12/29/2019    K 4.3 12/29/2019     12/29/2019    CREATININE 0.8 12/29/2019    BUN 13 12/29/2019    CO2 28 12/29/2019    TSH 1.59 07/03/2019        ECHOCARDIOGRAM RESULTS  No results found for this or any previous visit.        CURRENT/PREVIOUS VISIT EKG  Results for orders placed or performed in visit on 02/21/22   IN OFFICE EKG 12-LEAD (to PASSNFLY)    Collection Time: 02/21/22  1:48 PM    Narrative    Test Reason : R07.9,    Vent. Rate : 078 BPM     Atrial Rate : 078 BPM     P-R Int : 136 ms          QRS Dur : 070 ms      QT Int : 356 ms       P-R-T Axes : 068 083 067 degrees     QTc Int : 405 ms    Normal sinus rhythm  Borderline Nonspecific ST and T wave abnormality  No previous ECGs available  Confirmed by Azael Nguyen MD ([id number]) on 2/27/2022 9:40:00 PM    Referred By: SUNG   SELF           Confirmed By:Azael Nguyen MD     No valid procedures specified.   Results for orders placed in visit on 03/16/22    Nuclear Stress Test    Interpretation Summary    The nuclear portion of this study will be reported separately.    The EKG portion of this study is negative for ischemia.    The patient reported no chest pain during the stress test.    There were no arrhythmias during stress.      Physical Exam:  CONSTITUTIONAL: No fever, no chills  HEENT: Normocephalic, atraumatic,pupils reactive to light                 NECK:  No JVD no carotid bruit  CVS: S1S2+, RRR, no murmurs,   LUNGS: Clear  ABDOMEN: Soft, NT, BS+  EXTREMITIES: No cyanosis, edema  : No starr catheter  NEURO: AAO X 3  PSY: Normal affect      Medication List with Changes/Refills   Current Medications    ASPIRIN  (ECOTRIN) 81 MG EC TABLET    Take 81 mg by mouth once daily.    ATORVASTATIN (LIPITOR) 20 MG TABLET    Take 20 mg by mouth once daily.    CITALOPRAM (CELEXA) 40 MG TABLET    Take 1 tablet (40 mg total) by mouth once daily.    GLUCOSAMINE-CHONDROITIN 500-400 MG TABLET    Take 1 tablet by mouth 3 (three) times daily.    LEVOTHYROXINE (SYNTHROID) 88 MCG TABLET    Take 88 mcg by mouth once daily.    MULTIVIT WITH CALCIUM,IRON,MIN (WOMEN'S MULTIPLE VITAMINS ORAL)    Take by mouth.    NAPROXEN (NAPROSYN) 500 MG TABLET    1 tablet daily as needed.    PANTOPRAZOLE (PROTONIX) 40 MG TABLET    Take 1 tablet (40 mg total) by mouth once daily.    RIZATRIPTAN (MAXALT) 10 MG TABLET    Take 10 mg by mouth as needed for Migraine.   Discontinued Medications    KETOCONAZOLE (NIZORAL) 2 % CREAM    Apply bid to rash between and under breasts             Assessment:       1. Chest pain, unspecified type    2. Gastroesophageal reflux disease, unspecified whether esophagitis present         Plan:     1. Nuclear stress test was negative for reversible ischemia.  2. Patient no longer has any chest discomfort and appears to be feeling better on the Protonix.  She was given a 1 month prescription.  Would like her to follow-up with her primary care provider or gastroenterologist if she has any further issues with GERD.  Attached some information for her regarding reflux.  3. Will see her back for follow-up appointment in about 6 months.  May call return sooner if problems arise.  Problem List Items Addressed This Visit        Unprioritized    Chest pain - Primary    Gastroesophageal reflux disease          Follow up in about 6 months (around 9/21/2022).

## 2022-03-23 ENCOUNTER — PATIENT MESSAGE (OUTPATIENT)
Dept: FAMILY MEDICINE | Facility: CLINIC | Age: 62
End: 2022-03-23
Payer: COMMERCIAL

## 2022-03-30 ENCOUNTER — LAB VISIT (OUTPATIENT)
Dept: LAB | Facility: HOSPITAL | Age: 62
End: 2022-03-30
Attending: NURSE PRACTITIONER
Payer: COMMERCIAL

## 2022-03-30 ENCOUNTER — OFFICE VISIT (OUTPATIENT)
Dept: FAMILY MEDICINE | Facility: CLINIC | Age: 62
End: 2022-03-30
Payer: COMMERCIAL

## 2022-03-30 VITALS
WEIGHT: 150.81 LBS | SYSTOLIC BLOOD PRESSURE: 110 MMHG | DIASTOLIC BLOOD PRESSURE: 70 MMHG | BODY MASS INDEX: 26.72 KG/M2 | HEART RATE: 90 BPM | TEMPERATURE: 98 F | HEIGHT: 63 IN | OXYGEN SATURATION: 98 %

## 2022-03-30 DIAGNOSIS — G43.009 MIGRAINE WITHOUT AURA AND WITHOUT STATUS MIGRAINOSUS, NOT INTRACTABLE: ICD-10-CM

## 2022-03-30 DIAGNOSIS — Z01.818 PRE-OP EXAM: ICD-10-CM

## 2022-03-30 DIAGNOSIS — N62 LARGE BREASTS: ICD-10-CM

## 2022-03-30 DIAGNOSIS — Z01.818 PRE-OP EXAM: Primary | ICD-10-CM

## 2022-03-30 DIAGNOSIS — E03.9 HYPOTHYROIDISM, UNSPECIFIED TYPE: ICD-10-CM

## 2022-03-30 DIAGNOSIS — E78.5 HYPERLIPIDEMIA, UNSPECIFIED HYPERLIPIDEMIA TYPE: ICD-10-CM

## 2022-03-30 LAB
ALBUMIN SERPL BCP-MCNC: 3.9 G/DL (ref 3.5–5.2)
ALP SERPL-CCNC: 97 U/L (ref 55–135)
ALT SERPL W/O P-5'-P-CCNC: 32 U/L (ref 10–44)
ANION GAP SERPL CALC-SCNC: 10 MMOL/L (ref 8–16)
AST SERPL-CCNC: 26 U/L (ref 10–40)
BASOPHILS # BLD AUTO: 0.03 K/UL (ref 0–0.2)
BASOPHILS NFR BLD: 0.7 % (ref 0–1.9)
BILIRUB SERPL-MCNC: 0.4 MG/DL (ref 0.1–1)
BUN SERPL-MCNC: 11 MG/DL (ref 6–20)
CALCIUM SERPL-MCNC: 9.9 MG/DL (ref 8.7–10.5)
CHLORIDE SERPL-SCNC: 107 MMOL/L (ref 95–110)
CO2 SERPL-SCNC: 24 MMOL/L (ref 23–29)
CREAT SERPL-MCNC: 0.7 MG/DL (ref 0.5–1.4)
DIFFERENTIAL METHOD: ABNORMAL
EOSINOPHIL # BLD AUTO: 0.2 K/UL (ref 0–0.5)
EOSINOPHIL NFR BLD: 3.7 % (ref 0–8)
ERYTHROCYTE [DISTWIDTH] IN BLOOD BY AUTOMATED COUNT: 13.2 % (ref 11.5–14.5)
EST. GFR  (AFRICAN AMERICAN): >60 ML/MIN/1.73 M^2
EST. GFR  (NON AFRICAN AMERICAN): >60 ML/MIN/1.73 M^2
GLUCOSE SERPL-MCNC: 90 MG/DL (ref 70–110)
HCT VFR BLD AUTO: 45.1 % (ref 37–48.5)
HGB BLD-MCNC: 14.2 G/DL (ref 12–16)
IMM GRANULOCYTES # BLD AUTO: 0.01 K/UL (ref 0–0.04)
IMM GRANULOCYTES NFR BLD AUTO: 0.2 % (ref 0–0.5)
LYMPHOCYTES # BLD AUTO: 1.3 K/UL (ref 1–4.8)
LYMPHOCYTES NFR BLD: 30 % (ref 18–48)
MCH RBC QN AUTO: 29.2 PG (ref 27–31)
MCHC RBC AUTO-ENTMCNC: 31.5 G/DL (ref 32–36)
MCV RBC AUTO: 93 FL (ref 82–98)
MONOCYTES # BLD AUTO: 0.5 K/UL (ref 0.3–1)
MONOCYTES NFR BLD: 10.7 % (ref 4–15)
NEUTROPHILS # BLD AUTO: 2.4 K/UL (ref 1.8–7.7)
NEUTROPHILS NFR BLD: 54.7 % (ref 38–73)
NRBC BLD-RTO: 0 /100 WBC
PLATELET # BLD AUTO: 183 K/UL (ref 150–450)
PMV BLD AUTO: 12.6 FL (ref 9.2–12.9)
POTASSIUM SERPL-SCNC: 4.3 MMOL/L (ref 3.5–5.1)
PROT SERPL-MCNC: 7.3 G/DL (ref 6–8.4)
RBC # BLD AUTO: 4.86 M/UL (ref 4–5.4)
SODIUM SERPL-SCNC: 141 MMOL/L (ref 136–145)
WBC # BLD AUTO: 4.3 K/UL (ref 3.9–12.7)

## 2022-03-30 PROCEDURE — 1159F PR MEDICATION LIST DOCUMENTED IN MEDICAL RECORD: ICD-10-PCS | Mod: CPTII,S$GLB,, | Performed by: NURSE PRACTITIONER

## 2022-03-30 PROCEDURE — 85025 COMPLETE CBC W/AUTO DIFF WBC: CPT | Performed by: NURSE PRACTITIONER

## 2022-03-30 PROCEDURE — 1160F RVW MEDS BY RX/DR IN RCRD: CPT | Mod: CPTII,S$GLB,, | Performed by: NURSE PRACTITIONER

## 2022-03-30 PROCEDURE — 3078F DIAST BP <80 MM HG: CPT | Mod: CPTII,S$GLB,, | Performed by: NURSE PRACTITIONER

## 2022-03-30 PROCEDURE — 1159F MED LIST DOCD IN RCRD: CPT | Mod: CPTII,S$GLB,, | Performed by: NURSE PRACTITIONER

## 2022-03-30 PROCEDURE — 3008F PR BODY MASS INDEX (BMI) DOCUMENTED: ICD-10-PCS | Mod: CPTII,S$GLB,, | Performed by: NURSE PRACTITIONER

## 2022-03-30 PROCEDURE — 3078F PR MOST RECENT DIASTOLIC BLOOD PRESSURE < 80 MM HG: ICD-10-PCS | Mod: CPTII,S$GLB,, | Performed by: NURSE PRACTITIONER

## 2022-03-30 PROCEDURE — 3074F SYST BP LT 130 MM HG: CPT | Mod: CPTII,S$GLB,, | Performed by: NURSE PRACTITIONER

## 2022-03-30 PROCEDURE — 3008F BODY MASS INDEX DOCD: CPT | Mod: CPTII,S$GLB,, | Performed by: NURSE PRACTITIONER

## 2022-03-30 PROCEDURE — 99214 PR OFFICE/OUTPT VISIT, EST, LEVL IV, 30-39 MIN: ICD-10-PCS | Mod: S$GLB,,, | Performed by: NURSE PRACTITIONER

## 2022-03-30 PROCEDURE — 1160F PR REVIEW ALL MEDS BY PRESCRIBER/CLIN PHARMACIST DOCUMENTED: ICD-10-PCS | Mod: CPTII,S$GLB,, | Performed by: NURSE PRACTITIONER

## 2022-03-30 PROCEDURE — 99214 OFFICE O/P EST MOD 30 MIN: CPT | Mod: S$GLB,,, | Performed by: NURSE PRACTITIONER

## 2022-03-30 PROCEDURE — 99999 PR PBB SHADOW E&M-EST. PATIENT-LVL IV: CPT | Mod: PBBFAC,,, | Performed by: NURSE PRACTITIONER

## 2022-03-30 PROCEDURE — 99999 PR PBB SHADOW E&M-EST. PATIENT-LVL IV: ICD-10-PCS | Mod: PBBFAC,,, | Performed by: NURSE PRACTITIONER

## 2022-03-30 PROCEDURE — 36415 COLL VENOUS BLD VENIPUNCTURE: CPT | Mod: PO | Performed by: NURSE PRACTITIONER

## 2022-03-30 PROCEDURE — 3074F PR MOST RECENT SYSTOLIC BLOOD PRESSURE < 130 MM HG: ICD-10-PCS | Mod: CPTII,S$GLB,, | Performed by: NURSE PRACTITIONER

## 2022-03-30 PROCEDURE — 80053 COMPREHEN METABOLIC PANEL: CPT | Performed by: NURSE PRACTITIONER

## 2022-03-30 NOTE — PROGRESS NOTES
Subjective:       Patient ID: Karen Muhammad is a 61 y.o. female.    Chief Complaint: Pre-op Exam    HPI  Past Medical History:   Diagnosis Date    Anticoagulant long-term use     Anxiety     Depression     Hyperlipidemia     Migraine     Thyroid disease      Past Surgical History:   Procedure Laterality Date    EYE SURGERY  1990    pterygium removed    MOLE REMOVAL  1997    premalignant    WISDOM TOOTH EXTRACTION        reports that she has never smoked. She has never used smokeless tobacco. She reports current alcohol use. She reports that she does not use drugs.  Review of Systems   Constitutional: Negative for chills and fever.   HENT: Negative for congestion, ear discharge, ear pain, postnasal drip, rhinorrhea, sinus pressure, sinus pain and sore throat.    Eyes: Negative for pain, discharge, redness and itching.   Respiratory: Negative for cough, wheezing and stridor.    Cardiovascular: Negative for chest pain, palpitations and leg swelling.   Gastrointestinal: Negative for abdominal pain, blood in stool, constipation, diarrhea, nausea and vomiting.   Genitourinary: Negative for difficulty urinating, dysuria, flank pain, frequency and hematuria.   Musculoskeletal: Negative for gait problem.        Pain in upper back, neck or shoulders    Skin: Negative for rash.   Neurological: Negative for dizziness, weakness, light-headedness and headaches.       Objective:      Physical Exam  Constitutional:       General: She is not in acute distress.     Appearance: She is well-developed. She is not diaphoretic.   HENT:      Head: Normocephalic and atraumatic.      Right Ear: External ear normal.      Left Ear: External ear normal.      Nose: Nose normal.   Eyes:      General: No scleral icterus.        Right eye: No discharge.         Left eye: No discharge.      Conjunctiva/sclera: Conjunctivae normal.      Pupils: Pupils are equal, round, and reactive to light.   Neck:      Thyroid: No thyromegaly.       Vascular: No JVD.   Cardiovascular:      Rate and Rhythm: Normal rate and regular rhythm.      Heart sounds: No murmur heard.    No friction rub.   Pulmonary:      Effort: Pulmonary effort is normal. No respiratory distress.      Breath sounds: Normal breath sounds. No wheezing or rales.   Abdominal:      General: Bowel sounds are normal.      Palpations: Abdomen is soft.      Tenderness: There is no abdominal tenderness. There is no guarding or rebound.   Musculoskeletal:         General: Normal range of motion.      Cervical back: Normal range of motion and neck supple.   Lymphadenopathy:      Cervical: No cervical adenopathy.   Skin:     General: Skin is warm and dry.      Findings: No rash.   Neurological:      Mental Status: She is alert and oriented to person, place, and time.   Psychiatric:         Behavior: Behavior normal.         Thought Content: Thought content normal.         Judgment: Judgment normal.         Assessment:       1. Pre-op exam    2. Large breasts    3. Hyperlipidemia, unspecified hyperlipidemia type    4. Hypothyroidism, unspecified type    5. Migraine without aura and without status migrainosus, not intractable        Plan:         Pre-op exam  -     CBC Auto Differential; Future; Expected date: 03/30/2022  -     Comprehensive Metabolic Panel; Future; Expected date: 03/30/2022  -     Urinalysis; Future; Expected date: 03/30/2022  -     Urine culture; Future; Expected date: 03/30/2022    Large breasts  - has breast reduction surgery scheduled 04/14/2022    Hyperlipidemia, unspecified hyperlipidemia type  - The current medical regimen is effective;  continue present plan and medications.    Hypothyroidism, unspecified type  - The current medical regimen is effective;  continue present plan and medications.    Migraine without aura and without status migrainosus, not intractable  - The current medical regimen is effective;  continue present plan and medications.    EKG was done prior and  was cleared for surgery with the current EKG.  UA normal   Urine culture no growth  CBC and CMP ok   This is a low cardiac risk patient that is medically maximized for a low risk cardiac surgery. She is cleared for surgery.

## 2022-03-30 NOTE — PATIENT INSTRUCTIONS
Jorden Jones,     If you are due for any health screening(s) below please notify me so we can arrange them to be ordered and scheduled to maintain your health. Most healthy patients complete it.     Health Maintenance   Topic Date Due    Lipid Panel  12/09/2022    Mammogram  12/22/2022    TETANUS VACCINE  10/25/2027    Hepatitis C Screening  Completed                      March 30, 2022       Karen Muhammad  [address] [facility] [address]           Dear Karen:    Your [facility] care is dedicated to helping you stay healthy with regular scheduled recommended screenings.  Scheduling routine screenings is important to maintaining good health. Our records indicate that you may be overdue for your screening pap smear.  Pap smear screening can help identify patients at risk for developing cervical cancer at an early stage, when it is most likely to be successfully treated.    The current recommendation for Pap smear screening is every 3-5 years.  We encourage you to schedule your appointment with your [facility] provider.  Many women see a Gynecologist for this screening but some primary care providers also provide Pap screening  If you recently had your pap smear screening performed outside of [facility], please let your Health care team know so that they can update your health record.      If you have questions or want to schedule your screening elsewhere, please call [phone] to speak to a [facility] coordinator.    Sincerely,    Your [facility] Care Team     Check labs and UA with culture prior to surgery clearance on 4/14/2022

## 2022-03-30 NOTE — PROGRESS NOTES
Subjective:       Patient ID: Karen Muhammad is a 61 y.o. female.    Chief Complaint: No chief complaint on file.    61-year-old female presents to the clinic today for preop for breast reduction on 04/14/2022.  She states she has upper back pain, neck pain, and shoulder pain secondary to enlarged breasts.  She denies any cardiac chest pain, heart palpitations, shortness of breath, or swelling to lower extremities.  She denies any headaches, dizziness, or blurred vision.  She was told to stop her aspirin 1 week prior to surgery.  She denies any specific complaints today.  She takes Aspirin and  herbal medications. She has not been on prolonged courses of steroids. She denies any history of problems or history of  family problems with anesthesia. She reports no history of blood clots or excessive bleeding. She reports good exercise tolerance.       Past Medical History:   Diagnosis Date    Anticoagulant long-term use     Anxiety     Depression     Hyperlipidemia     Migraine     Thyroid disease      Past Surgical History:   Procedure Laterality Date    EYE SURGERY  1990    pterygium removed    MOLE REMOVAL  1997    premalignant    WISDOM TOOTH EXTRACTION        reports that she has never smoked. She has never used smokeless tobacco. She reports current alcohol use. She reports that she does not use drugs.  Review of Systems    Objective:      Physical Exam    Assessment:       1. Pre-op exam    2. Large breasts    3. Hyperlipidemia, unspecified hyperlipidemia type    4. Hypothyroidism, unspecified type    5. Migraine without aura and without status migrainosus, not intractable        Plan:       ***    No follow-ups on file.

## 2022-04-06 ENCOUNTER — TELEPHONE (OUTPATIENT)
Dept: FAMILY MEDICINE | Facility: CLINIC | Age: 62
End: 2022-04-06
Payer: COMMERCIAL

## 2022-04-06 NOTE — TELEPHONE ENCOUNTER
----- Message from Jonna Houston sent at 4/6/2022  1:34 PM CDT -----  Contact: Pt  Type: Needs Medical Advice    Who Called:  Pt    Best Call Back Number: [phone]    Additional Information: Wants to know if test results were sent to [facility].  Pt is having surgery soon & they need results.  Please call back. Thanks.

## 2022-04-06 NOTE — TELEPHONE ENCOUNTER
I spoke with the patient and explained that her preop was faxed before lunch today.  Patient verbalized understanding of above.

## 2022-09-15 ENCOUNTER — TELEPHONE (OUTPATIENT)
Dept: CARDIOLOGY | Facility: CLINIC | Age: 62
End: 2022-09-15
Payer: COMMERCIAL

## 2022-10-11 ENCOUNTER — PATIENT MESSAGE (OUTPATIENT)
Dept: CARDIOLOGY | Facility: CLINIC | Age: 62
End: 2022-10-11
Payer: COMMERCIAL

## 2022-10-11 DIAGNOSIS — M85.89 OTHER SPECIFIED DISORDERS OF BONE DENSITY AND STRUCTURE, MULTIPLE SITES: Primary | ICD-10-CM

## 2022-10-11 DIAGNOSIS — M89.9 BONE DISEASE: ICD-10-CM

## 2022-12-07 ENCOUNTER — TELEPHONE (OUTPATIENT)
Dept: CARDIOLOGY | Facility: CLINIC | Age: 62
End: 2022-12-07
Payer: COMMERCIAL

## 2022-12-14 ENCOUNTER — TELEPHONE (OUTPATIENT)
Dept: FAMILY MEDICINE | Facility: CLINIC | Age: 62
End: 2022-12-14

## 2022-12-14 ENCOUNTER — OFFICE VISIT (OUTPATIENT)
Dept: FAMILY MEDICINE | Facility: CLINIC | Age: 62
End: 2022-12-14
Payer: COMMERCIAL

## 2022-12-14 VITALS
OXYGEN SATURATION: 97 % | BODY MASS INDEX: 25.2 KG/M2 | TEMPERATURE: 98 F | DIASTOLIC BLOOD PRESSURE: 70 MMHG | SYSTOLIC BLOOD PRESSURE: 112 MMHG | RESPIRATION RATE: 12 BRPM | WEIGHT: 142.19 LBS | HEIGHT: 63 IN | HEART RATE: 83 BPM

## 2022-12-14 DIAGNOSIS — E78.5 HYPERLIPIDEMIA, UNSPECIFIED HYPERLIPIDEMIA TYPE: ICD-10-CM

## 2022-12-14 DIAGNOSIS — G43.009 MIGRAINE WITHOUT AURA AND WITHOUT STATUS MIGRAINOSUS, NOT INTRACTABLE: ICD-10-CM

## 2022-12-14 DIAGNOSIS — E06.3 HASHIMOTO'S THYROIDITIS: ICD-10-CM

## 2022-12-14 DIAGNOSIS — F32.A DEPRESSION, UNSPECIFIED DEPRESSION TYPE: ICD-10-CM

## 2022-12-14 DIAGNOSIS — Z00.00 ANNUAL PHYSICAL EXAM: Primary | ICD-10-CM

## 2022-12-14 DIAGNOSIS — E03.9 HYPOTHYROIDISM, UNSPECIFIED TYPE: ICD-10-CM

## 2022-12-14 DIAGNOSIS — R79.89 ELEVATED PARATHYROID HORMONE: ICD-10-CM

## 2022-12-14 PROCEDURE — 1160F RVW MEDS BY RX/DR IN RCRD: CPT | Mod: CPTII,S$GLB,, | Performed by: FAMILY MEDICINE

## 2022-12-14 PROCEDURE — 1159F MED LIST DOCD IN RCRD: CPT | Mod: CPTII,S$GLB,, | Performed by: FAMILY MEDICINE

## 2022-12-14 PROCEDURE — 1159F PR MEDICATION LIST DOCUMENTED IN MEDICAL RECORD: ICD-10-PCS | Mod: CPTII,S$GLB,, | Performed by: FAMILY MEDICINE

## 2022-12-14 PROCEDURE — 3008F BODY MASS INDEX DOCD: CPT | Mod: CPTII,S$GLB,, | Performed by: FAMILY MEDICINE

## 2022-12-14 PROCEDURE — 3074F SYST BP LT 130 MM HG: CPT | Mod: CPTII,S$GLB,, | Performed by: FAMILY MEDICINE

## 2022-12-14 PROCEDURE — 99999 PR PBB SHADOW E&M-EST. PATIENT-LVL IV: ICD-10-PCS | Mod: PBBFAC,,, | Performed by: FAMILY MEDICINE

## 2022-12-14 PROCEDURE — 99396 PR PREVENTIVE VISIT,EST,40-64: ICD-10-PCS | Mod: S$GLB,,, | Performed by: FAMILY MEDICINE

## 2022-12-14 PROCEDURE — 99396 PREV VISIT EST AGE 40-64: CPT | Mod: S$GLB,,, | Performed by: FAMILY MEDICINE

## 2022-12-14 PROCEDURE — 3078F PR MOST RECENT DIASTOLIC BLOOD PRESSURE < 80 MM HG: ICD-10-PCS | Mod: CPTII,S$GLB,, | Performed by: FAMILY MEDICINE

## 2022-12-14 PROCEDURE — 3078F DIAST BP <80 MM HG: CPT | Mod: CPTII,S$GLB,, | Performed by: FAMILY MEDICINE

## 2022-12-14 PROCEDURE — 99999 PR PBB SHADOW E&M-EST. PATIENT-LVL IV: CPT | Mod: PBBFAC,,, | Performed by: FAMILY MEDICINE

## 2022-12-14 PROCEDURE — 3008F PR BODY MASS INDEX (BMI) DOCUMENTED: ICD-10-PCS | Mod: CPTII,S$GLB,, | Performed by: FAMILY MEDICINE

## 2022-12-14 PROCEDURE — 1160F PR REVIEW ALL MEDS BY PRESCRIBER/CLIN PHARMACIST DOCUMENTED: ICD-10-PCS | Mod: CPTII,S$GLB,, | Performed by: FAMILY MEDICINE

## 2022-12-14 PROCEDURE — 3074F PR MOST RECENT SYSTOLIC BLOOD PRESSURE < 130 MM HG: ICD-10-PCS | Mod: CPTII,S$GLB,, | Performed by: FAMILY MEDICINE

## 2022-12-14 RX ORDER — CITALOPRAM 40 MG/1
40 TABLET, FILM COATED ORAL DAILY
Qty: 90 TABLET | Refills: 3 | Status: SHIPPED | OUTPATIENT
Start: 2022-12-14 | End: 2023-12-17 | Stop reason: SDUPTHER

## 2022-12-14 RX ORDER — RIZATRIPTAN BENZOATE 10 MG/1
10 TABLET ORAL
Qty: 9 TABLET | Status: SHIPPED | OUTPATIENT
Start: 2022-12-14

## 2022-12-14 NOTE — TELEPHONE ENCOUNTER
----- Message from Cristina Barcenas MD sent at 12/14/2022 12:34 PM CST -----  Get labs Dr. Zhang 10/2022 [facility]

## 2022-12-14 NOTE — PROGRESS NOTES
Subjective:       Patient ID: Karen Muhammad is a 62 y.o. female.    Chief Complaint: Annual Exam    HPI  Review of Systems   Constitutional:  Negative for fatigue and unexpected weight change.   Respiratory:  Negative for chest tightness and shortness of breath.    Cardiovascular:  Negative for chest pain, palpitations and leg swelling.   Gastrointestinal:  Negative for abdominal pain.   Musculoskeletal:  Negative for arthralgias.   Neurological:  Negative for dizziness, syncope, light-headedness and headaches.     Patient Active Problem List   Diagnosis    Hyperlipidemia    Migraine    Hashimoto's thyroiditis    Anxiety    Depression    Rectal bleeding    Post herpetic neuralgia    Hypercalcemia    Elevated parathyroid hormone    Mixed hyperlipidemia    History of migraine    Chest pain    Gastroesophageal reflux disease     Patient is here for a chronic conditions follow up.    Card Dr. Nguyen stress test 2/22 neg for ischemia      Consult Plastic Dr. Summers C/o large breasts causing chronic strain on neck and shoulders. Feels it contributes to migraines.  Has deep grooves from bras. Gets rashes under both breast at least once a year.  Would like breast reduction  Left breast armpit mass -did mammo and u/s - benign 2020. Dr. Clark. Mammo 2021  neg. S/p breast reduction 4/22-recovering well.      celexa working well. Has had anger and irritability when tried to get off.     Dr. Beaulieu Endocrine monitoring thyroid      dexa 9/2021 osteopenia         Neuro Dr. Ayala -neuro for migraines.  occurs cyclically 2-3 a month. maxalt works .  Sometimes has known triggers     Derm Dr. Escalera treating lichen sclerosis in vulva. Doing better on current regimen  Objective:      Physical Exam  Vitals and nursing note reviewed.   Constitutional:       Appearance: She is well-developed.   Cardiovascular:      Rate and Rhythm: Normal rate and regular rhythm.      Heart sounds: Normal heart sounds.   Pulmonary:       Effort: Pulmonary effort is normal.      Breath sounds: Normal breath sounds.   Skin:     General: Skin is warm and dry.   Neurological:      Mental Status: She is alert and oriented to person, place, and time.       Assessment:       1. Annual physical exam    2. Depression, unspecified depression type    3. Migraine without aura and without status migrainosus, not intractable    4. Hypothyroidism, unspecified type    5. Hyperlipidemia, unspecified hyperlipidemia type    6. Hashimoto's thyroiditis    7. Elevated parathyroid hormone        Plan:         1. Depression, unspecified depression type  Controlled on current medications.  Continue current medications.    - citalopram (CELEXA) 40 MG tablet; Take 1 tablet (40 mg total) by mouth once daily.  Dispense: 90 tablet; Refill: 3    2. Migraine without aura and without status migrainosus, not intractable  Cont use prn  - rizatriptan (MAXALT) 10 MG tablet; Take 1 tablet (10 mg total) by mouth as needed for Migraine.  Dispense: 9 tablet; Refill: PRN    3. Hypothyroidism, unspecified type  Cont endocrine monitoring and mgmt    4. Hyperlipidemia, unspecified hyperlipidemia type  Cont endocrine monitoring and mgmt    5. Hashimoto's thyroiditis  Cont endocrine monitoring and mgmt    6. Elevated parathyroid hormone  Cont endocrine monitoring and mgmt    7. Annual physical exam  Discussed health maintenance guidelines appropriate for age.            Time spent with patient: 20 minutes    Patient with be reevaluated in 1 year or sooner prn    Greater than 50% of this visit was spent counseling as described in above documentation:Yes

## 2023-06-06 ENCOUNTER — LAB VISIT (OUTPATIENT)
Dept: LAB | Facility: HOSPITAL | Age: 63
End: 2023-06-06
Attending: NURSE PRACTITIONER
Payer: COMMERCIAL

## 2023-06-06 ENCOUNTER — OFFICE VISIT (OUTPATIENT)
Dept: FAMILY MEDICINE | Facility: CLINIC | Age: 63
End: 2023-06-06
Payer: COMMERCIAL

## 2023-06-06 VITALS
OXYGEN SATURATION: 99 % | DIASTOLIC BLOOD PRESSURE: 70 MMHG | BODY MASS INDEX: 24.84 KG/M2 | WEIGHT: 140.19 LBS | SYSTOLIC BLOOD PRESSURE: 118 MMHG | HEART RATE: 81 BPM | HEIGHT: 63 IN | TEMPERATURE: 98 F

## 2023-06-06 DIAGNOSIS — E78.5 HYPERLIPIDEMIA, UNSPECIFIED HYPERLIPIDEMIA TYPE: ICD-10-CM

## 2023-06-06 DIAGNOSIS — R21 SKIN RASH: ICD-10-CM

## 2023-06-06 DIAGNOSIS — M54.50 LEFT-SIDED LOW BACK PAIN WITHOUT SCIATICA, UNSPECIFIED CHRONICITY: ICD-10-CM

## 2023-06-06 DIAGNOSIS — M54.50 LEFT-SIDED LOW BACK PAIN WITHOUT SCIATICA, UNSPECIFIED CHRONICITY: Primary | ICD-10-CM

## 2023-06-06 DIAGNOSIS — L30.4 INTERTRIGO: ICD-10-CM

## 2023-06-06 LAB
ALBUMIN SERPL BCP-MCNC: 4 G/DL (ref 3.5–5.2)
ALP SERPL-CCNC: 119 U/L (ref 55–135)
ALT SERPL W/O P-5'-P-CCNC: 173 U/L (ref 10–44)
ANION GAP SERPL CALC-SCNC: 6 MMOL/L (ref 8–16)
AST SERPL-CCNC: 113 U/L (ref 10–40)
BACTERIA #/AREA URNS AUTO: NORMAL /HPF
BASOPHILS # BLD AUTO: 0.04 K/UL (ref 0–0.2)
BASOPHILS NFR BLD: 0.9 % (ref 0–1.9)
BILIRUB SERPL-MCNC: 0.5 MG/DL (ref 0.1–1)
BILIRUB UR QL STRIP: NEGATIVE
BUN SERPL-MCNC: 12 MG/DL (ref 8–23)
CALCIUM SERPL-MCNC: 9.8 MG/DL (ref 8.7–10.5)
CHLORIDE SERPL-SCNC: 106 MMOL/L (ref 95–110)
CHOLEST SERPL-MCNC: 144 MG/DL (ref 120–199)
CHOLEST/HDLC SERPL: 3.3 {RATIO} (ref 2–5)
CLARITY UR REFRACT.AUTO: CLEAR
CO2 SERPL-SCNC: 28 MMOL/L (ref 23–29)
COLOR UR AUTO: YELLOW
CREAT SERPL-MCNC: 0.6 MG/DL (ref 0.5–1.4)
DIFFERENTIAL METHOD: ABNORMAL
EOSINOPHIL # BLD AUTO: 0.2 K/UL (ref 0–0.5)
EOSINOPHIL NFR BLD: 4.3 % (ref 0–8)
ERYTHROCYTE [DISTWIDTH] IN BLOOD BY AUTOMATED COUNT: 13.2 % (ref 11.5–14.5)
EST. GFR  (NO RACE VARIABLE): >60 ML/MIN/1.73 M^2
GLUCOSE SERPL-MCNC: 88 MG/DL (ref 70–110)
GLUCOSE UR QL STRIP: NEGATIVE
HCT VFR BLD AUTO: 44.5 % (ref 37–48.5)
HDLC SERPL-MCNC: 43 MG/DL (ref 40–75)
HDLC SERPL: 29.9 % (ref 20–50)
HGB BLD-MCNC: 13.9 G/DL (ref 12–16)
HGB UR QL STRIP: NEGATIVE
IMM GRANULOCYTES # BLD AUTO: 0.01 K/UL (ref 0–0.04)
IMM GRANULOCYTES NFR BLD AUTO: 0.2 % (ref 0–0.5)
KETONES UR QL STRIP: NEGATIVE
LDLC SERPL CALC-MCNC: 73.8 MG/DL (ref 63–159)
LEUKOCYTE ESTERASE UR QL STRIP: ABNORMAL
LYMPHOCYTES # BLD AUTO: 1.5 K/UL (ref 1–4.8)
LYMPHOCYTES NFR BLD: 34.2 % (ref 18–48)
MCH RBC QN AUTO: 28.4 PG (ref 27–31)
MCHC RBC AUTO-ENTMCNC: 31.2 G/DL (ref 32–36)
MCV RBC AUTO: 91 FL (ref 82–98)
MICROSCOPIC COMMENT: NORMAL
MONOCYTES # BLD AUTO: 0.4 K/UL (ref 0.3–1)
MONOCYTES NFR BLD: 8.1 % (ref 4–15)
NEUTROPHILS # BLD AUTO: 2.3 K/UL (ref 1.8–7.7)
NEUTROPHILS NFR BLD: 52.3 % (ref 38–73)
NITRITE UR QL STRIP: NEGATIVE
NONHDLC SERPL-MCNC: 101 MG/DL
NRBC BLD-RTO: 0 /100 WBC
PH UR STRIP: 6 [PH] (ref 5–8)
PLATELET # BLD AUTO: 183 K/UL (ref 150–450)
PMV BLD AUTO: 12.6 FL (ref 9.2–12.9)
POTASSIUM SERPL-SCNC: 4 MMOL/L (ref 3.5–5.1)
PROT SERPL-MCNC: 7.2 G/DL (ref 6–8.4)
PROT UR QL STRIP: NEGATIVE
RBC # BLD AUTO: 4.9 M/UL (ref 4–5.4)
RBC #/AREA URNS AUTO: 2 /HPF (ref 0–4)
SODIUM SERPL-SCNC: 140 MMOL/L (ref 136–145)
SP GR UR STRIP: 1.01 (ref 1–1.03)
SQUAMOUS #/AREA URNS AUTO: 1 /HPF
TRIGL SERPL-MCNC: 136 MG/DL (ref 30–150)
URN SPEC COLLECT METH UR: ABNORMAL
WBC # BLD AUTO: 4.44 K/UL (ref 3.9–12.7)
WBC #/AREA URNS AUTO: 4 /HPF (ref 0–5)

## 2023-06-06 PROCEDURE — 80053 COMPREHEN METABOLIC PANEL: CPT | Performed by: NURSE PRACTITIONER

## 2023-06-06 PROCEDURE — 81001 URINALYSIS AUTO W/SCOPE: CPT | Performed by: NURSE PRACTITIONER

## 2023-06-06 PROCEDURE — 87186 SC STD MICRODIL/AGAR DIL: CPT | Performed by: NURSE PRACTITIONER

## 2023-06-06 PROCEDURE — 3078F PR MOST RECENT DIASTOLIC BLOOD PRESSURE < 80 MM HG: ICD-10-PCS | Mod: CPTII,S$GLB,, | Performed by: NURSE PRACTITIONER

## 2023-06-06 PROCEDURE — 1159F MED LIST DOCD IN RCRD: CPT | Mod: CPTII,S$GLB,, | Performed by: NURSE PRACTITIONER

## 2023-06-06 PROCEDURE — 99213 OFFICE O/P EST LOW 20 MIN: CPT | Mod: S$GLB,,, | Performed by: NURSE PRACTITIONER

## 2023-06-06 PROCEDURE — 3008F PR BODY MASS INDEX (BMI) DOCUMENTED: ICD-10-PCS | Mod: CPTII,S$GLB,, | Performed by: NURSE PRACTITIONER

## 2023-06-06 PROCEDURE — 3074F SYST BP LT 130 MM HG: CPT | Mod: CPTII,S$GLB,, | Performed by: NURSE PRACTITIONER

## 2023-06-06 PROCEDURE — 87088 URINE BACTERIA CULTURE: CPT | Performed by: NURSE PRACTITIONER

## 2023-06-06 PROCEDURE — 99999 PR PBB SHADOW E&M-EST. PATIENT-LVL IV: CPT | Mod: PBBFAC,,, | Performed by: NURSE PRACTITIONER

## 2023-06-06 PROCEDURE — 87077 CULTURE AEROBIC IDENTIFY: CPT | Performed by: NURSE PRACTITIONER

## 2023-06-06 PROCEDURE — 1160F RVW MEDS BY RX/DR IN RCRD: CPT | Mod: CPTII,S$GLB,, | Performed by: NURSE PRACTITIONER

## 2023-06-06 PROCEDURE — 3074F PR MOST RECENT SYSTOLIC BLOOD PRESSURE < 130 MM HG: ICD-10-PCS | Mod: CPTII,S$GLB,, | Performed by: NURSE PRACTITIONER

## 2023-06-06 PROCEDURE — 99213 PR OFFICE/OUTPT VISIT, EST, LEVL III, 20-29 MIN: ICD-10-PCS | Mod: S$GLB,,, | Performed by: NURSE PRACTITIONER

## 2023-06-06 PROCEDURE — 3078F DIAST BP <80 MM HG: CPT | Mod: CPTII,S$GLB,, | Performed by: NURSE PRACTITIONER

## 2023-06-06 PROCEDURE — 36415 COLL VENOUS BLD VENIPUNCTURE: CPT | Mod: PO | Performed by: NURSE PRACTITIONER

## 2023-06-06 PROCEDURE — 85025 COMPLETE CBC W/AUTO DIFF WBC: CPT | Performed by: NURSE PRACTITIONER

## 2023-06-06 PROCEDURE — 3008F BODY MASS INDEX DOCD: CPT | Mod: CPTII,S$GLB,, | Performed by: NURSE PRACTITIONER

## 2023-06-06 PROCEDURE — 80061 LIPID PANEL: CPT | Performed by: NURSE PRACTITIONER

## 2023-06-06 PROCEDURE — 1159F PR MEDICATION LIST DOCUMENTED IN MEDICAL RECORD: ICD-10-PCS | Mod: CPTII,S$GLB,, | Performed by: NURSE PRACTITIONER

## 2023-06-06 PROCEDURE — 87086 URINE CULTURE/COLONY COUNT: CPT | Performed by: NURSE PRACTITIONER

## 2023-06-06 PROCEDURE — 99999 PR PBB SHADOW E&M-EST. PATIENT-LVL IV: ICD-10-PCS | Mod: PBBFAC,,, | Performed by: NURSE PRACTITIONER

## 2023-06-06 PROCEDURE — 1160F PR REVIEW ALL MEDS BY PRESCRIBER/CLIN PHARMACIST DOCUMENTED: ICD-10-PCS | Mod: CPTII,S$GLB,, | Performed by: NURSE PRACTITIONER

## 2023-06-06 RX ORDER — CLOTRIMAZOLE 1 %
CREAM (GRAM) TOPICAL 2 TIMES DAILY
Qty: 85 G | Refills: 0 | Status: SHIPPED | OUTPATIENT
Start: 2023-06-06

## 2023-06-06 NOTE — PROGRESS NOTES
Subjective:       Patient ID: Karen Muhammad is a 63 y.o. female.    Chief Complaint: Rash and Back Pain     HPI   64 y/o female patient with medical problems listed below presents for left lower back pain for one month. Denies trauma to the affected area. Describes pain as aching, intermittent, non-radiating, aggravated by sitting or laying down, pain 6/10 in intensity on its worst, not associated with nausea, vomiting, abdominal pain, dysuria, hematuria, bowel symptoms, fever, chills, skin rash. Patient is currently not taking pain medication.   She states has had skin rash off and on in between and under breasts. Used topical steroid cream but no relief. States symptoms are aggravated by heat. Denies skin rash currently.   Patient also states had labs done at the [facility] in 3/2023, and ; AST/ALT 37/64 elevated. She likes to repeat the test.     Patient Active Problem List   Diagnosis    Hyperlipidemia    Migraine    Hashimoto's thyroiditis    Anxiety    Depression    Rectal bleeding    Post herpetic neuralgia    Hypercalcemia    Elevated parathyroid hormone    Mixed hyperlipidemia    History of migraine    Chest pain    Gastroesophageal reflux disease      Review of patient's allergies indicates:  No Known Allergies    Past Surgical History:   Procedure Laterality Date    EYE SURGERY  1990    pterygium removed    MOLE REMOVAL  1997    premalignant    WISDOM TOOTH EXTRACTION          Current Outpatient Medications:     aspirin (ECOTRIN) 81 MG EC tablet, Take 81 mg by mouth once daily., Disp: , Rfl:     atorvastatin (LIPITOR) 20 MG tablet, Take 20 mg by mouth once daily., Disp: , Rfl:     citalopram (CELEXA) 40 MG tablet, Take 1 tablet (40 mg total) by mouth once daily., Disp: 90 tablet, Rfl: 3    glucosamine-chondroitin 500-400 mg tablet, Take 1 tablet by mouth 3 (three) times daily., Disp: , Rfl:     levothyroxine (SYNTHROID) 88 MCG tablet, Take 88 mcg by mouth once daily., Disp: , Rfl:     MULTIVIT  "WITH CALCIUM,IRON,MIN (WOMEN'S MULTIPLE VITAMINS ORAL), Take by mouth., Disp: , Rfl:     naproxen (NAPROSYN) 500 MG tablet, 1 tablet daily as needed., Disp: , Rfl: 10    rizatriptan (MAXALT) 10 MG tablet, Take 1 tablet (10 mg total) by mouth as needed for Migraine., Disp: 9 tablet, Rfl: PRN    clotrimazole (LOTRIMIN) 1 % cream, Apply topically 2 (two) times daily., Disp: 85 g, Rfl: 0    Review of Systems   Constitutional:  Negative for chills and fever.   Respiratory:  Negative for cough, chest tightness and shortness of breath.    Cardiovascular:  Negative for chest pain and palpitations.   Gastrointestinal:  Negative for abdominal pain.   Musculoskeletal:  Positive for back pain. Negative for gait problem.   Skin:  Positive for color change and rash.   Neurological:  Negative for dizziness and headaches.       Objective:   /70 (BP Location: Right arm, Patient Position: Sitting, BP Method: Medium (Manual))   Pulse 81   Temp 98.2 °F (36.8 °C) (Oral)   Ht 5' 3" (1.6 m)   Wt 63.6 kg (140 lb 3.4 oz)   SpO2 99%   BMI 24.84 kg/m²         Physical Exam  Constitutional:       General: She is not in acute distress.     Appearance: Normal appearance.   HENT:      Head: Atraumatic.   Cardiovascular:      Rate and Rhythm: Normal rate and regular rhythm.      Pulses: Normal pulses.      Heart sounds: Normal heart sounds.   Pulmonary:      Effort: Pulmonary effort is normal.      Breath sounds: Normal breath sounds.   Abdominal:      General: Abdomen is flat. Bowel sounds are normal.      Palpations: Abdomen is soft.      Tenderness: There is no abdominal tenderness.   Musculoskeletal:         General: Tenderness present.      Comments: +tenderness in left lower back   Skin:     General: Skin is warm and dry.      Findings: Erythema present.      Comments: +erythema in between and under breasts   Neurological:      General: No focal deficit present.      Mental Status: She is alert and oriented to person, place, and " time.   Psychiatric:         Mood and Affect: Mood normal.       Assessment:       1. Left-sided low back pain without sciatica, unspecified chronicity    2. Intertrigo    3. Hyperlipidemia, unspecified hyperlipidemia type    4. Skin rash        Plan:       1. Intertrigo  - clotrimazole (LOTRIMIN) 1 % cream; Apply topically 2 (two) times daily.  Dispense: 85 g; Refill: 0    2. Left-sided low back pain without sciatica, unspecified chronicity  - Urinalysis  - Urine culture  - CT Abdomen Pelvis  Without Contrast; Future  - CBC Auto Differential; Future  - Comprehensive Metabolic Panel; Future    3. Hyperlipidemia, unspecified hyperlipidemia type  - Lipid Panel; Future    4. Skin rash    Patient with be reevaluated in  one week  or sooner marisela Falk NP

## 2023-06-07 ENCOUNTER — HOSPITAL ENCOUNTER (OUTPATIENT)
Dept: RADIOLOGY | Facility: HOSPITAL | Age: 63
Discharge: HOME | End: 2023-06-07
Attending: NURSE PRACTITIONER
Payer: COMMERCIAL

## 2023-06-07 DIAGNOSIS — M54.50 LEFT-SIDED LOW BACK PAIN WITHOUT SCIATICA, UNSPECIFIED CHRONICITY: ICD-10-CM

## 2023-06-07 PROCEDURE — 74176 CT ABDOMEN PELVIS WITHOUT CONTRAST: ICD-10-PCS | Mod: 26,,, | Performed by: RADIOLOGY

## 2023-06-07 PROCEDURE — 74176 CT ABD & PELVIS W/O CONTRAST: CPT | Mod: 26,,, | Performed by: RADIOLOGY

## 2023-06-07 PROCEDURE — 74176 CT ABD & PELVIS W/O CONTRAST: CPT | Mod: TC

## 2023-06-08 DIAGNOSIS — K76.89 LIVER CYST: Primary | ICD-10-CM

## 2023-06-09 DIAGNOSIS — N39.0 URINARY TRACT INFECTION WITHOUT HEMATURIA, SITE UNSPECIFIED: Primary | ICD-10-CM

## 2023-06-09 LAB — BACTERIA UR CULT: ABNORMAL

## 2023-06-09 RX ORDER — SULFAMETHOXAZOLE AND TRIMETHOPRIM 800; 160 MG/1; MG/1
1 TABLET ORAL 2 TIMES DAILY
Qty: 6 TABLET | Refills: 0 | Status: SHIPPED | OUTPATIENT
Start: 2023-06-09 | End: 2023-06-12

## 2023-06-11 DIAGNOSIS — R74.8 ELEVATED LIVER ENZYMES: Primary | ICD-10-CM

## 2023-06-16 ENCOUNTER — HOSPITAL ENCOUNTER (OUTPATIENT)
Dept: RADIOLOGY | Facility: CLINIC | Age: 63
Discharge: HOME | End: 2023-06-16
Attending: NURSE PRACTITIONER
Payer: COMMERCIAL

## 2023-06-16 ENCOUNTER — OFFICE VISIT (OUTPATIENT)
Dept: FAMILY MEDICINE | Facility: CLINIC | Age: 63
End: 2023-06-16
Payer: COMMERCIAL

## 2023-06-16 VITALS
HEART RATE: 73 BPM | SYSTOLIC BLOOD PRESSURE: 110 MMHG | BODY MASS INDEX: 24.96 KG/M2 | WEIGHT: 140.88 LBS | HEIGHT: 63 IN | TEMPERATURE: 98 F | OXYGEN SATURATION: 99 % | DIASTOLIC BLOOD PRESSURE: 64 MMHG

## 2023-06-16 DIAGNOSIS — M54.50 LEFT-SIDED LOW BACK PAIN WITHOUT SCIATICA, UNSPECIFIED CHRONICITY: ICD-10-CM

## 2023-06-16 DIAGNOSIS — Z09 FOLLOW-UP EXAM: Primary | ICD-10-CM

## 2023-06-16 DIAGNOSIS — N39.0 URINARY TRACT INFECTION WITHOUT HEMATURIA, SITE UNSPECIFIED: ICD-10-CM

## 2023-06-16 DIAGNOSIS — R74.8 ELEVATED LIVER ENZYMES: ICD-10-CM

## 2023-06-16 PROCEDURE — 1160F RVW MEDS BY RX/DR IN RCRD: CPT | Mod: CPTII,S$GLB,, | Performed by: NURSE PRACTITIONER

## 2023-06-16 PROCEDURE — 1160F PR REVIEW ALL MEDS BY PRESCRIBER/CLIN PHARMACIST DOCUMENTED: ICD-10-PCS | Mod: CPTII,S$GLB,, | Performed by: NURSE PRACTITIONER

## 2023-06-16 PROCEDURE — 3008F BODY MASS INDEX DOCD: CPT | Mod: CPTII,S$GLB,, | Performed by: NURSE PRACTITIONER

## 2023-06-16 PROCEDURE — 72100 XR LUMBAR SPINE AP AND LATERAL: ICD-10-PCS | Mod: 26,,, | Performed by: RADIOLOGY

## 2023-06-16 PROCEDURE — 3074F SYST BP LT 130 MM HG: CPT | Mod: CPTII,S$GLB,, | Performed by: NURSE PRACTITIONER

## 2023-06-16 PROCEDURE — 3008F PR BODY MASS INDEX (BMI) DOCUMENTED: ICD-10-PCS | Mod: CPTII,S$GLB,, | Performed by: NURSE PRACTITIONER

## 2023-06-16 PROCEDURE — 81003 URINALYSIS AUTO W/O SCOPE: CPT | Performed by: NURSE PRACTITIONER

## 2023-06-16 PROCEDURE — 72100 X-RAY EXAM L-S SPINE 2/3 VWS: CPT | Mod: TC,FY,PO

## 2023-06-16 PROCEDURE — 99999 PR PBB SHADOW E&M-EST. PATIENT-LVL IV: CPT | Mod: PBBFAC,,, | Performed by: NURSE PRACTITIONER

## 2023-06-16 PROCEDURE — 3078F PR MOST RECENT DIASTOLIC BLOOD PRESSURE < 80 MM HG: ICD-10-PCS | Mod: CPTII,S$GLB,, | Performed by: NURSE PRACTITIONER

## 2023-06-16 PROCEDURE — 3078F DIAST BP <80 MM HG: CPT | Mod: CPTII,S$GLB,, | Performed by: NURSE PRACTITIONER

## 2023-06-16 PROCEDURE — 99999 PR PBB SHADOW E&M-EST. PATIENT-LVL IV: ICD-10-PCS | Mod: PBBFAC,,, | Performed by: NURSE PRACTITIONER

## 2023-06-16 PROCEDURE — 1159F MED LIST DOCD IN RCRD: CPT | Mod: CPTII,S$GLB,, | Performed by: NURSE PRACTITIONER

## 2023-06-16 PROCEDURE — 1159F PR MEDICATION LIST DOCUMENTED IN MEDICAL RECORD: ICD-10-PCS | Mod: CPTII,S$GLB,, | Performed by: NURSE PRACTITIONER

## 2023-06-16 PROCEDURE — 72100 X-RAY EXAM L-S SPINE 2/3 VWS: CPT | Mod: 26,,, | Performed by: RADIOLOGY

## 2023-06-16 PROCEDURE — 99213 PR OFFICE/OUTPT VISIT, EST, LEVL III, 20-29 MIN: ICD-10-PCS | Mod: S$GLB,,, | Performed by: NURSE PRACTITIONER

## 2023-06-16 PROCEDURE — 87086 URINE CULTURE/COLONY COUNT: CPT | Performed by: NURSE PRACTITIONER

## 2023-06-16 PROCEDURE — 3074F PR MOST RECENT SYSTOLIC BLOOD PRESSURE < 130 MM HG: ICD-10-PCS | Mod: CPTII,S$GLB,, | Performed by: NURSE PRACTITIONER

## 2023-06-16 PROCEDURE — 99213 OFFICE O/P EST LOW 20 MIN: CPT | Mod: S$GLB,,, | Performed by: NURSE PRACTITIONER

## 2023-06-16 NOTE — PROGRESS NOTES
Subjective:       Patient ID: Karen Muhammad is a 63 y.o. female.    Chief Complaint: Follow-up     HPI   64 y/o female patient with medical problems listed below presents for 2 weeks follow up of left lower back pain for one month. States finished antibiotic for UTI. States back pain was not as often as before while on antibiotic. Had back pain yesterday but it did not return yet today. Denies nausea, vomiting, abdominal pain, urinary or bowel symptoms, fever, chills, generalized body ache. Denies etoh or taking tylenol.    Labs reviewed from 6/2023- AST/ALT elevated 113/173; ordered hepatitis panel and GGT, US Abdomen which has not done, Urine culture grew small amount e coli; patient given bactrim ds.    CT Abdomen 6/6/2023  Impression:   No acute abnormality.  No cause for the patient's left flank pain is identified.   Fluid density liver lesions, likely cysts, may be confirmed with ultrasound.   Nonobstructing right renal calculus.   Colonic diverticula without evidence of acute diverticulitis.    Patient Active Problem List   Diagnosis    Hyperlipidemia    Migraine    Hashimoto's thyroiditis    Anxiety    Depression    Rectal bleeding    Post herpetic neuralgia    Hypercalcemia    Elevated parathyroid hormone    Mixed hyperlipidemia    History of migraine    Chest pain    Gastroesophageal reflux disease      Review of patient's allergies indicates:  No Known Allergies    Past Surgical History:   Procedure Laterality Date    EYE SURGERY  1990    pterygium removed    MOLE REMOVAL  1997    premalignant    WISDOM TOOTH EXTRACTION          Current Outpatient Medications:     aspirin (ECOTRIN) 81 MG EC tablet, Take 81 mg by mouth once daily., Disp: , Rfl:     atorvastatin (LIPITOR) 20 MG tablet, Take 20 mg by mouth once daily., Disp: , Rfl:     citalopram (CELEXA) 40 MG tablet, Take 1 tablet (40 mg total) by mouth once daily., Disp: 90 tablet, Rfl: 3    clotrimazole (LOTRIMIN) 1 % cream, Apply topically 2  "(two) times daily., Disp: 85 g, Rfl: 0    glucosamine-chondroitin 500-400 mg tablet, Take 1 tablet by mouth 3 (three) times daily., Disp: , Rfl:     levothyroxine (SYNTHROID) 88 MCG tablet, Take 88 mcg by mouth once daily., Disp: , Rfl:     MULTIVIT WITH CALCIUM,IRON,MIN (WOMEN'S MULTIPLE VITAMINS ORAL), Take by mouth., Disp: , Rfl:     naproxen (NAPROSYN) 500 MG tablet, 1 tablet daily as needed., Disp: , Rfl: 10    rizatriptan (MAXALT) 10 MG tablet, Take 1 tablet (10 mg total) by mouth as needed for Migraine., Disp: 9 tablet, Rfl: PRN    Lab Results   Component Value Date    WBC 4.44 06/06/2023    HGB 13.9 06/06/2023    HCT 44.5 06/06/2023     06/06/2023    CHOL 144 06/06/2023    TRIG 136 06/06/2023    HDL 43 06/06/2023     (H) 06/06/2023     (H) 06/06/2023     06/06/2023    K 4.0 06/06/2023     06/06/2023    CREATININE 0.6 06/06/2023    BUN 12 06/06/2023    CO2 28 06/06/2023    TSH 1.59 07/03/2019     Review of Systems   Constitutional:  Negative for chills and fever.   Respiratory:  Negative for cough, chest tightness and shortness of breath.    Cardiovascular:  Negative for chest pain and palpitations.   Gastrointestinal:  Negative for abdominal pain.   Musculoskeletal:  Positive for back pain.   Neurological:  Negative for dizziness and headaches.       Objective:   /64 (BP Location: Right arm, Patient Position: Sitting, BP Method: Medium (Manual))   Pulse 73   Temp 97.9 °F (36.6 °C) (Oral)   Ht 5' 3" (1.6 m)   Wt 63.9 kg (140 lb 14 oz)   SpO2 99%   BMI 24.95 kg/m²      Physical Exam  Constitutional:       General: She is not in acute distress.     Appearance: Normal appearance.   HENT:      Head: Atraumatic.   Cardiovascular:      Rate and Rhythm: Normal rate and regular rhythm.      Pulses: Normal pulses.      Heart sounds: Normal heart sounds.   Pulmonary:      Effort: Pulmonary effort is normal.      Breath sounds: Normal breath sounds.   Abdominal:      General: " Abdomen is flat. Bowel sounds are normal.      Palpations: Abdomen is soft.   Skin:     General: Skin is warm and dry.   Neurological:      General: No focal deficit present.      Mental Status: She is alert and oriented to person, place, and time.   Psychiatric:         Mood and Affect: Mood normal.       Assessment:       1. Follow-up exam    2. Urinary tract infection without hematuria, site unspecified    3. Left-sided low back pain without sciatica, unspecified chronicity    4. Elevated liver enzymes        Plan:       1. Urinary tract infection without hematuria, site unspecified  - Urinalysis  - Urine culture    2. Left-sided low back pain without sciatica, unspecified chronicity  - X-Ray Lumbar Spine AP And Lateral; Future    3. Follow-up exam    4. Elevated liver enzymes  - GGT and hepatitis panel, and US Abdomen as scheduled     Patient with be reevaluated in  one week after test done   or sooner marisela Falk NP

## 2023-06-17 LAB
BILIRUB UR QL STRIP: NEGATIVE
CLARITY UR REFRACT.AUTO: CLEAR
COLOR UR AUTO: YELLOW
GLUCOSE UR QL STRIP: NEGATIVE
HGB UR QL STRIP: NEGATIVE
KETONES UR QL STRIP: NEGATIVE
LEUKOCYTE ESTERASE UR QL STRIP: NEGATIVE
NITRITE UR QL STRIP: NEGATIVE
PH UR STRIP: 6 [PH] (ref 5–8)
PROT UR QL STRIP: NEGATIVE
SP GR UR STRIP: 1 (ref 1–1.03)
URN SPEC COLLECT METH UR: NORMAL

## 2023-06-18 LAB — BACTERIA UR CULT: NORMAL

## 2023-06-19 ENCOUNTER — HOSPITAL ENCOUNTER (OUTPATIENT)
Dept: RADIOLOGY | Facility: HOSPITAL | Age: 63
Discharge: HOME | End: 2023-06-19
Attending: NURSE PRACTITIONER
Payer: COMMERCIAL

## 2023-06-19 DIAGNOSIS — K76.89 LIVER CYST: ICD-10-CM

## 2023-06-19 PROCEDURE — 76700 US ABDOMEN COMPLETE: ICD-10-PCS | Mod: 26,,, | Performed by: RADIOLOGY

## 2023-06-19 PROCEDURE — 76700 US EXAM ABDOM COMPLETE: CPT | Mod: 26,,, | Performed by: RADIOLOGY

## 2023-06-19 PROCEDURE — 76700 US EXAM ABDOM COMPLETE: CPT | Mod: TC

## 2023-06-23 DIAGNOSIS — R74.8 ELEVATED LIVER ENZYMES: Primary | ICD-10-CM

## 2023-06-23 DIAGNOSIS — M54.50 LEFT-SIDED LOW BACK PAIN WITHOUT SCIATICA, UNSPECIFIED CHRONICITY: ICD-10-CM

## 2023-06-23 RX ORDER — NAPROXEN 500 MG/1
250 TABLET ORAL 2 TIMES DAILY PRN
Qty: 60 TABLET | Refills: 0 | Status: SHIPPED | OUTPATIENT
Start: 2023-06-23

## 2023-08-02 NOTE — TELEPHONE ENCOUNTER
----- Message from Jorge Lemons MD sent at 7/4/2019 10:10 PM CDT -----  Results are somewhat abnormal. Please keep regular follow up.  
Yes

## 2023-08-30 DIAGNOSIS — Z12.31 OTHER SCREENING MAMMOGRAM: ICD-10-CM

## 2023-09-21 ENCOUNTER — TELEPHONE (OUTPATIENT)
Dept: FAMILY MEDICINE | Facility: CLINIC | Age: 63
End: 2023-09-21
Payer: COMMERCIAL

## 2023-09-21 NOTE — TELEPHONE ENCOUNTER
----- Message from Ambrocio Calderon sent at 9/21/2023 12:59 PM CDT -----  Regarding: reschedule annual exam  Contact: guerline at [phone]  Type:  Sooner Appointment Request    Caller is requesting a sooner appointment.      Name of Caller:  Karen    When is the first available appointment?  No solution found without overruling.    Symptoms:  Annual    Best Call Back Number:  [phone]    Additional Information:  pt got letter stating appt for 12/15/23 was canceled b/c provider out of office. Please call to reschedule.

## 2023-09-21 NOTE — TELEPHONE ENCOUNTER
Returned patients call in regards to needing to reschedule annual appointment. No answer, left voicemail to return call.

## 2023-10-09 ENCOUNTER — HOSPITAL ENCOUNTER (OUTPATIENT)
Dept: RADIOLOGY | Facility: HOSPITAL | Age: 63
Discharge: HOME | End: 2023-10-09
Attending: INTERNAL MEDICINE

## 2023-10-09 DIAGNOSIS — M89.9 BONE DISEASE: ICD-10-CM

## 2023-10-09 DIAGNOSIS — M85.89 OTHER SPECIFIED DISORDERS OF BONE DENSITY AND STRUCTURE, MULTIPLE SITES: ICD-10-CM

## 2023-10-09 PROCEDURE — 77080 DXA BONE DENSITY AXIAL: CPT | Mod: TC,PO

## 2023-12-17 DIAGNOSIS — F32.A DEPRESSION, UNSPECIFIED DEPRESSION TYPE: ICD-10-CM

## 2023-12-18 RX ORDER — CITALOPRAM 40 MG/1
40 TABLET, FILM COATED ORAL DAILY
Qty: 90 TABLET | Refills: 3 | Status: SHIPPED | OUTPATIENT
Start: 2023-12-18

## 2023-12-18 NOTE — TELEPHONE ENCOUNTER
No care due was identified.  [facility] Embedded Care Due Messages. Reference number: [id number].   12/17/2023 11:52:28 AM CST

## 2023-12-18 NOTE — TELEPHONE ENCOUNTER
LOV 6/16/23  LAB 6/16/23  Next OV 1/19/24    My appointment was changed from Dec 15 to Jan 19 since Dr Barcenas would not be in on Dec 15. Thats why I need a revill. Thanks

## 2024-01-19 ENCOUNTER — TELEPHONE (OUTPATIENT)
Dept: FAMILY MEDICINE | Facility: CLINIC | Age: 64
End: 2024-01-19
Payer: COMMERCIAL

## 2024-01-19 ENCOUNTER — OFFICE VISIT (OUTPATIENT)
Dept: FAMILY MEDICINE | Facility: CLINIC | Age: 64
End: 2024-01-19
Payer: COMMERCIAL

## 2024-01-19 VITALS
HEIGHT: 63 IN | WEIGHT: 141.13 LBS | DIASTOLIC BLOOD PRESSURE: 70 MMHG | HEART RATE: 85 BPM | SYSTOLIC BLOOD PRESSURE: 120 MMHG | RESPIRATION RATE: 12 BRPM | BODY MASS INDEX: 25.01 KG/M2 | OXYGEN SATURATION: 98 % | TEMPERATURE: 98 F

## 2024-01-19 DIAGNOSIS — R79.89 ELEVATED PARATHYROID HORMONE: ICD-10-CM

## 2024-01-19 DIAGNOSIS — E78.5 HYPERLIPIDEMIA, UNSPECIFIED HYPERLIPIDEMIA TYPE: ICD-10-CM

## 2024-01-19 DIAGNOSIS — E06.3 HASHIMOTO'S THYROIDITIS: ICD-10-CM

## 2024-01-19 DIAGNOSIS — R74.8 ELEVATED LIVER ENZYMES: ICD-10-CM

## 2024-01-19 DIAGNOSIS — E03.9 HYPOTHYROIDISM, UNSPECIFIED TYPE: ICD-10-CM

## 2024-01-19 DIAGNOSIS — Z12.31 ENCOUNTER FOR SCREENING MAMMOGRAM FOR MALIGNANT NEOPLASM OF BREAST: ICD-10-CM

## 2024-01-19 DIAGNOSIS — Z00.00 ANNUAL PHYSICAL EXAM: Primary | ICD-10-CM

## 2024-01-19 DIAGNOSIS — K76.89 LIVER CYST: ICD-10-CM

## 2024-01-19 DIAGNOSIS — F32.0 CURRENT MILD EPISODE OF MAJOR DEPRESSIVE DISORDER WITHOUT PRIOR EPISODE: ICD-10-CM

## 2024-01-19 PROCEDURE — 1160F RVW MEDS BY RX/DR IN RCRD: CPT | Mod: CPTII,S$GLB,, | Performed by: FAMILY MEDICINE

## 2024-01-19 PROCEDURE — 3008F BODY MASS INDEX DOCD: CPT | Mod: CPTII,S$GLB,, | Performed by: FAMILY MEDICINE

## 2024-01-19 PROCEDURE — 3074F SYST BP LT 130 MM HG: CPT | Mod: CPTII,S$GLB,, | Performed by: FAMILY MEDICINE

## 2024-01-19 PROCEDURE — 1159F MED LIST DOCD IN RCRD: CPT | Mod: CPTII,S$GLB,, | Performed by: FAMILY MEDICINE

## 2024-01-19 PROCEDURE — 99396 PREV VISIT EST AGE 40-64: CPT | Mod: S$GLB,,, | Performed by: FAMILY MEDICINE

## 2024-01-19 PROCEDURE — 99999 PR PBB SHADOW E&M-EST. PATIENT-LVL IV: CPT | Mod: PBBFAC,,, | Performed by: FAMILY MEDICINE

## 2024-01-19 PROCEDURE — 3078F DIAST BP <80 MM HG: CPT | Mod: CPTII,S$GLB,, | Performed by: FAMILY MEDICINE

## 2024-01-19 RX ORDER — ATORVASTATIN CALCIUM 10 MG/1
10 TABLET, FILM COATED ORAL DAILY
COMMUNITY

## 2024-01-19 NOTE — PROGRESS NOTES
Subjective:       Patient ID: Karen Muhammad is a 63 y.o. female.    Chief Complaint: Annual Exam    HPI  Review of Systems   Constitutional:  Negative for fatigue and unexpected weight change.   Respiratory:  Negative for chest tightness and shortness of breath.    Cardiovascular:  Negative for chest pain, palpitations and leg swelling.   Gastrointestinal:  Negative for abdominal pain.   Musculoskeletal:  Negative for arthralgias.   Neurological:  Negative for dizziness, syncope, light-headedness and headaches.       Patient Active Problem List   Diagnosis    Hyperlipidemia    Migraine    Hashimoto's thyroiditis    Anxiety    Depression    Rectal bleeding    Post herpetic neuralgia    Hypercalcemia    Elevated parathyroid hormone    Mixed hyperlipidemia    History of migraine    Chest pain    Gastroesophageal reflux disease     Patient is here for a chronic conditions follow up.    Reviewed labs 6/23 elevated lfts. U/s abd showed hepatic cysts. Hep panel neg. On lipitor    Endocrine Dr. Beaulieu hypothyroid, elevated pth    Eye Dr. Sampson cataract     Card Dr. Nguyen stress test 2/22 neg for ischemia        Consult Plastic Dr. Summers C/o large breasts causing chronic strain on neck and shoulders. Feels it contributes to migraines.  Has deep grooves from bras. Gets rashes under both breast at least once a year.  Would like breast reduction  Left breast armpit mass -did mammo and u/s - benign 2020. Dr. Clark. Mammo 2021  neg. S/p breast reduction 4/22-recovering well.      celexa working well. Has had anger and irritability when tried to get off.     Dr. Beaulieu Endocrine monitoring thyroid      dexa 9/2021 osteopenia         Neuro Dr. Ayala -neuro for migraines.  occurs cyclically 2-3 a month. maxalt works .  Sometimes has known triggers     Derm Dr. Escalera treating lichen sclerosis in vulva. Doing better on current regimen  Objective:      Physical Exam  Vitals and nursing note reviewed.    Constitutional:       Appearance: She is well-developed.   Cardiovascular:      Rate and Rhythm: Normal rate and regular rhythm.      Heart sounds: Normal heart sounds.   Pulmonary:      Effort: Pulmonary effort is normal.      Breath sounds: Normal breath sounds.   Skin:     General: Skin is warm and dry.   Neurological:      Mental Status: She is alert and oriented to person, place, and time.         Assessment:       1. Elevated liver enzymes    2. Liver cyst    3. Hyperlipidemia, unspecified hyperlipidemia type    4. Hypothyroidism, unspecified type    5. Hashimoto's thyroiditis    6. Elevated parathyroid hormone    7. Encounter for screening mammogram for malignant neoplasm of breast        Plan:       1. Elevated liver enzymes  Your liver enzymes are slightly elevated.  Avoid liver irritants like tylenol and alcohol, eat a low fat diet and work toward an ideal body weight to help lower liver inflammation.   repeat  - CBC Auto Differential; Future  - Comprehensive Metabolic Panel; Future  - LIPASE; Future    2. Liver cyst  See above    3. Hyperlipidemia, unspecified hyperlipidemia type  Stable condition.  Continue current medications.  Will adjust based on lab findings or if condition changes.      4. Hypothyroidism, unspecified type  Stable condition.  Continue current medications.  Will adjust based on lab findings or if condition changes.      5. Hashimoto's thyroiditis  Cont monitoring    6. Elevated parathyroid hormone  Screen and treat as indicated:    - PTH, intact; Future    7. Encounter for screening mammogram for malignant neoplasm of breast  Screen and treat as indicated:    - Mammo Digital Screening Bilat w/ John; Future    8. Annual physical exam  Discussed health maintenance guidelines appropriate for age.        9. Current mild episode of major depressive disorder without prior episode  Controlled on current medications.  Continue current medications.        Time spent with patient: 20  minutes    Patient with be reevaluated in 1 year or sooner prn    Greater than 50% of this visit was spent counseling as described in above documentation:Yes

## 2024-01-25 PROBLEM — F32.0 CURRENT MILD EPISODE OF MAJOR DEPRESSIVE DISORDER WITHOUT PRIOR EPISODE: Status: ACTIVE | Noted: 2024-01-25

## 2024-02-12 ENCOUNTER — HOSPITAL ENCOUNTER (OUTPATIENT)
Dept: RADIOLOGY | Facility: HOSPITAL | Age: 64
Discharge: HOME | End: 2024-02-12
Attending: FAMILY MEDICINE
Payer: COMMERCIAL

## 2024-02-12 DIAGNOSIS — Z12.31 ENCOUNTER FOR SCREENING MAMMOGRAM FOR MALIGNANT NEOPLASM OF BREAST: ICD-10-CM

## 2024-02-12 PROCEDURE — 77067 SCR MAMMO BI INCL CAD: CPT | Mod: TC

## 2024-02-12 PROCEDURE — 77063 BREAST TOMOSYNTHESIS BI: CPT | Mod: 26,,, | Performed by: RADIOLOGY

## 2024-02-12 PROCEDURE — 77067 SCR MAMMO BI INCL CAD: CPT | Mod: 26,,, | Performed by: RADIOLOGY

## 2024-03-20 ENCOUNTER — LAB VISIT (OUTPATIENT)
Dept: LAB | Facility: HOSPITAL | Age: 64
End: 2024-03-20
Attending: FAMILY MEDICINE
Payer: COMMERCIAL

## 2024-03-20 DIAGNOSIS — R74.8 ELEVATED LIVER ENZYMES: ICD-10-CM

## 2024-03-20 DIAGNOSIS — R79.89 ELEVATED PARATHYROID HORMONE: ICD-10-CM

## 2024-03-20 LAB
ALBUMIN SERPL BCP-MCNC: 4.1 G/DL (ref 3.5–5.2)
ALP SERPL-CCNC: 93 U/L (ref 55–135)
ALT SERPL W/O P-5'-P-CCNC: 40 U/L (ref 10–44)
ANION GAP SERPL CALC-SCNC: 7 MMOL/L (ref 8–16)
AST SERPL-CCNC: 31 U/L (ref 10–40)
BASOPHILS # BLD AUTO: 0.05 K/UL (ref 0–0.2)
BASOPHILS NFR BLD: 1.3 % (ref 0–1.9)
BILIRUB SERPL-MCNC: 0.6 MG/DL (ref 0.1–1)
BUN SERPL-MCNC: 19 MG/DL (ref 8–23)
CALCIUM SERPL-MCNC: 10.2 MG/DL (ref 8.7–10.5)
CHLORIDE SERPL-SCNC: 107 MMOL/L (ref 95–110)
CO2 SERPL-SCNC: 27 MMOL/L (ref 23–29)
CREAT SERPL-MCNC: 0.8 MG/DL (ref 0.5–1.4)
DIFFERENTIAL METHOD BLD: NORMAL
EOSINOPHIL # BLD AUTO: 0.2 K/UL (ref 0–0.5)
EOSINOPHIL NFR BLD: 4.3 % (ref 0–8)
ERYTHROCYTE [DISTWIDTH] IN BLOOD BY AUTOMATED COUNT: 13.5 % (ref 11.5–14.5)
EST. GFR  (NO RACE VARIABLE): >60 ML/MIN/1.73 M^2
GLUCOSE SERPL-MCNC: 93 MG/DL (ref 70–110)
HCT VFR BLD AUTO: 45.6 % (ref 37–48.5)
HGB BLD-MCNC: 14.6 G/DL (ref 12–16)
IMM GRANULOCYTES # BLD AUTO: 0 K/UL (ref 0–0.04)
IMM GRANULOCYTES NFR BLD AUTO: 0 % (ref 0–0.5)
LIPASE SERPL-CCNC: 20 U/L (ref 4–60)
LYMPHOCYTES # BLD AUTO: 1.4 K/UL (ref 1–4.8)
LYMPHOCYTES NFR BLD: 34.9 % (ref 18–48)
MCH RBC QN AUTO: 29.3 PG (ref 27–31)
MCHC RBC AUTO-ENTMCNC: 32 G/DL (ref 32–36)
MCV RBC AUTO: 92 FL (ref 82–98)
MONOCYTES # BLD AUTO: 0.5 K/UL (ref 0.3–1)
MONOCYTES NFR BLD: 11.8 % (ref 4–15)
NEUTROPHILS # BLD AUTO: 1.9 K/UL (ref 1.8–7.7)
NEUTROPHILS NFR BLD: 47.7 % (ref 38–73)
NRBC BLD-RTO: 0 /100 WBC
PLATELET # BLD AUTO: 174 K/UL (ref 150–450)
PMV BLD AUTO: 12 FL (ref 9.2–12.9)
POTASSIUM SERPL-SCNC: 4.3 MMOL/L (ref 3.5–5.1)
PROT SERPL-MCNC: 7.3 G/DL (ref 6–8.4)
PTH-INTACT SERPL-MCNC: 80 PG/ML (ref 9–77)
RBC # BLD AUTO: 4.98 M/UL (ref 4–5.4)
SODIUM SERPL-SCNC: 141 MMOL/L (ref 136–145)
WBC # BLD AUTO: 3.98 K/UL (ref 3.9–12.7)

## 2024-03-20 PROCEDURE — 85025 COMPLETE CBC W/AUTO DIFF WBC: CPT | Performed by: FAMILY MEDICINE

## 2024-03-20 PROCEDURE — 83690 ASSAY OF LIPASE: CPT | Performed by: FAMILY MEDICINE

## 2024-03-20 PROCEDURE — 36415 COLL VENOUS BLD VENIPUNCTURE: CPT | Mod: PO | Performed by: FAMILY MEDICINE

## 2024-03-20 PROCEDURE — 83970 ASSAY OF PARATHORMONE: CPT | Performed by: FAMILY MEDICINE

## 2024-03-20 PROCEDURE — 80053 COMPREHEN METABOLIC PANEL: CPT | Performed by: FAMILY MEDICINE

## 2024-12-26 DIAGNOSIS — F32.A DEPRESSION, UNSPECIFIED DEPRESSION TYPE: ICD-10-CM

## 2024-12-26 RX ORDER — CITALOPRAM 40 MG/1
40 TABLET, FILM COATED ORAL
Qty: 90 TABLET | Refills: 0 | Status: SHIPPED | OUTPATIENT
Start: 2024-12-26

## 2024-12-26 NOTE — TELEPHONE ENCOUNTER
No care due was identified.  [facility] Embedded Care Due Messages. Reference number: [id number].   12/26/2024 1:29:25 PM CST

## 2024-12-26 NOTE — TELEPHONE ENCOUNTER
Refill Decision Note   Karen Monroycourtneybrenna  is requesting a refill authorization.  Brief Assessment and Rationale for Refill:  Approve     Medication Therapy Plan:         Comments:     Note composed:1:40 PM 12/26/2024

## 2025-01-27 LAB
ALBUMIN SERPL BCP-MCNC: 4.6 G/DL
ALBUMIN/GLOB SERPL: 1.7 {RATIO}
ALP SERPL-CCNC: 99 U/L (ref 25–125)
ALT SERPL W P-5'-P-CCNC: 38 U/L (ref 7–35)
AST SERPL-CCNC: 25 U/L (ref 13–35)
BILIRUB SERPL-MCNC: 0.6 MG/DL (ref 0.1–1.4)
BUN SERPL-MCNC: 14 MG/DL
CALCIUM SERPL-MCNC: 10 MG/DL
CHLORIDE SERPL-SCNC: 104 MMOL/L (ref 99–108)
CHOL/HDLC RATIO: 3.9
CHOLEST SERPL-MSCNC: 192 MG/DL (ref 0–200)
CO2 SERPL-SCNC: 27 MMOL/L
CREAT SERPL-MCNC: 0.63 MG/DL
EGFR: 100
GLOBULIN SER-MCNC: 2.7 G/DL
GLUCOSE SERPL-MCNC: 84 MG/DL
HDLC SERPL-MCNC: 49 MG/DL (ref 35–70)
LDLC SERPL CALC-MCNC: 106 MG/DL (ref 0–160)
NONHDLC SERPL-MCNC: 3.9 MG/DL
POTASSIUM BLOOD: 4.3
PROT SERPL-MCNC: 7.3 G/DL
SODIUM BLOOD: 141
TRIGL SERPL-MCNC: 257 MG/DL (ref 40–160)

## 2025-02-07 ENCOUNTER — OFFICE VISIT (OUTPATIENT)
Dept: URGENT CARE | Facility: CLINIC | Age: 65
End: 2025-02-07
Payer: COMMERCIAL

## 2025-02-07 VITALS
HEIGHT: 63 IN | HEART RATE: 98 BPM | RESPIRATION RATE: 20 BRPM | TEMPERATURE: 98 F | BODY MASS INDEX: 24.8 KG/M2 | DIASTOLIC BLOOD PRESSURE: 86 MMHG | OXYGEN SATURATION: 97 % | SYSTOLIC BLOOD PRESSURE: 135 MMHG | WEIGHT: 140 LBS

## 2025-02-07 DIAGNOSIS — R05.9 COUGH, UNSPECIFIED TYPE: Primary | ICD-10-CM

## 2025-02-07 DIAGNOSIS — J01.90 ACUTE SINUSITIS, RECURRENCE NOT SPECIFIED, UNSPECIFIED LOCATION: ICD-10-CM

## 2025-02-07 LAB
CTP QC/QA: YES
CTP QC/QA: YES
FLUAV AG NPH QL: NEGATIVE
FLUBV AG NPH QL: NEGATIVE
SARS CORONAVIRUS 2 ANTIGEN: NEGATIVE

## 2025-02-07 PROCEDURE — 96372 THER/PROPH/DIAG INJ SC/IM: CPT | Mod: S$GLB,,, | Performed by: EMERGENCY MEDICINE

## 2025-02-07 PROCEDURE — 87804 INFLUENZA ASSAY W/OPTIC: CPT | Mod: QW,,, | Performed by: NURSE PRACTITIONER

## 2025-02-07 PROCEDURE — 99214 OFFICE O/P EST MOD 30 MIN: CPT | Mod: 25,S$GLB,, | Performed by: NURSE PRACTITIONER

## 2025-02-07 PROCEDURE — 87811 SARS-COV-2 COVID19 W/OPTIC: CPT | Mod: QW,S$GLB,, | Performed by: NURSE PRACTITIONER

## 2025-02-07 RX ORDER — CODEINE PHOSPHATE AND GUAIFENESIN 10; 100 MG/5ML; MG/5ML
5 SOLUTION ORAL 3 TIMES DAILY PRN
Qty: 118 ML | Refills: 0 | Status: SHIPPED | OUTPATIENT
Start: 2025-02-07 | End: 2025-02-21

## 2025-02-07 RX ORDER — DEXAMETHASONE SODIUM PHOSPHATE 4 MG/ML
8 INJECTION, SOLUTION INTRA-ARTICULAR; INTRALESIONAL; INTRAMUSCULAR; INTRAVENOUS; SOFT TISSUE
Status: COMPLETED | OUTPATIENT
Start: 2025-02-07 | End: 2025-02-07

## 2025-02-07 RX ORDER — BENZONATATE 100 MG/1
100 CAPSULE ORAL 3 TIMES DAILY PRN
Qty: 30 CAPSULE | Refills: 0 | Status: SHIPPED | OUTPATIENT
Start: 2025-02-07 | End: 2025-02-21

## 2025-02-07 RX ORDER — AMOXICILLIN 875 MG/1
875 TABLET, FILM COATED ORAL EVERY 12 HOURS
Qty: 14 TABLET | Refills: 0 | Status: SHIPPED | OUTPATIENT
Start: 2025-02-07 | End: 2025-02-21

## 2025-02-07 RX ORDER — PREDNISONE 20 MG/1
20 TABLET ORAL 2 TIMES DAILY
Qty: 8 TABLET | Refills: 0 | Status: SHIPPED | OUTPATIENT
Start: 2025-02-07 | End: 2025-02-11

## 2025-02-07 RX ADMIN — DEXAMETHASONE SODIUM PHOSPHATE 8 MG: 4 INJECTION, SOLUTION INTRA-ARTICULAR; INTRALESIONAL; INTRAMUSCULAR; INTRAVENOUS; SOFT TISSUE at 10:02

## 2025-02-07 NOTE — PATIENT INSTRUCTIONS
Amoxicillin twice a day for 7 days    Please take a probiotic while you are on antibiotics plus a few weeks. Examples of probiotics include brand names such as Align, Floraster and Culturelle, but generic versions are ok too. Kefir is a milk product that is also an excellent probiotic and can be taken as 1/4 cup three times a day with meals.      Start prednisone pills tomorrow and take as prescribed.  Always take with food  Personal protection against illness for 1-2 weeks due to lowered immune system from steroid therapy.  Please wear a mask and eye protection around others and wash hands often    Symptomatic treatment to include:    Rest, increase fluid intake to thin mucus, include electrolyte replacement  Ibuprofen/Tylenol as directed for fever, sore throat, body aches  Zyrtec or antihistamine of choice over-the-counter daily until symptoms have resolved  Flonase or nasal steroid of choice over-the-counter daily until symptoms have resolved  Tessalon Perles cough pills best use for cough caused by postnasal drip/throat irritation  Codeine cough syrup cough syrup at night only as directed as needed for cough Can take together with Tessalon Perles for added benefit  Refrain from using Afrin nasal spray or any decongestants as this will thicken mucous   Nasal saline spray several times a day  or nasal wash systems  Warm, salt water gargles, over the counter throat lozenges or sprays for sore throat.

## 2025-02-07 NOTE — PROGRESS NOTES
"Subjective:      Patient ID: Karen Muhammad is a 64 y.o. female.    Vitals:  height is 5' 3" (1.6 m) and weight is 63.5 kg (140 lb). Her oral temperature is 98.1 °F (36.7 °C). Her blood pressure is 135/86 and her pulse is 98. Her respiration is 20 and oxygen saturation is 97%.     Chief Complaint: Cough    Cough  Episode onset: x 5 days. The cough is Productive of sputum. Associated symptoms include ear pain (Left intermittent), headaches, nasal congestion and postnasal drip. Pertinent negatives include no fever, rash, shortness of breath or wheezing.       Constitution: Positive for fatigue. Negative for fever.   HENT:  Positive for ear pain (Left intermittent), congestion (Thick dark yellow), postnasal drip and sinus pressure. Negative for ear discharge.    Respiratory:  Positive for cough (Worse at night 1st thing in the morning). Negative for chest tightness, sputum production, shortness of breath and wheezing.    Gastrointestinal:  Negative for vomiting and diarrhea.   Skin:  Negative for rash.   Neurological:  Positive for headaches.      Objective:     Physical Exam   Constitutional: She is oriented to person, place, and time. She is cooperative.  Non-toxic appearance. She does not appear ill. No distress. awake  HENT:   Head: Normocephalic and atraumatic.   Ears:   Right Ear: External ear and ear canal normal. Tympanic membrane is bulging. Tympanic membrane is not erythematous.   Left Ear: External ear and ear canal normal. Tympanic membrane is bulging. Tympanic membrane is not erythematous.   Nose: Congestion present. No rhinorrhea.   Mouth/Throat: Mucous membranes are moist. No oropharyngeal exudate or posterior oropharyngeal erythema.      Comments: Thick postnasal drainage  Eyes: Conjunctivae are normal. Right eye exhibits no discharge. Left eye exhibits no discharge.   Neck: Neck supple. No neck rigidity present.   Cardiovascular: Normal rate, regular rhythm and normal heart sounds. "   Pulmonary/Chest: Effort normal and breath sounds normal. No accessory muscle usage. No tachypnea. No respiratory distress. She has no wheezes. She has no rhonchi. She has no rales. She exhibits no tenderness.   Abdominal: Normal appearance.   Musculoskeletal:      Cervical back: She exhibits no tenderness.   Lymphadenopathy:     She has no cervical adenopathy.   Neurological: no focal deficit. She is alert and oriented to person, place, and time. No sensory deficit.   Skin: Skin is warm, dry, not diaphoretic and no rash. Capillary refill takes 2 to 3 seconds.   Psychiatric: Her behavior is normal. Mood normal.   Nursing note and vitals reviewed.      Assessment:     1. Cough, unspecified type    2. Acute sinusitis, recurrence not specified, unspecified location      COVID negative  Flu a/B negative      Plan:       Cough, unspecified type  -     POCT Influenza A/B Rapid Antigen  -     SARS Coronavirus 2 Antigen, POCT Manual Read    Acute sinusitis, recurrence not specified, unspecified location  -     dexAMETHasone injection 8 mg  -     amoxicillin (AMOXIL) 875 MG tablet; Take 1 tablet (875 mg total) by mouth every 12 (twelve) hours. for 7 days  Dispense: 14 tablet; Refill: 0  -     predniSONE (DELTASONE) 20 MG tablet; Take 1 tablet (20 mg total) by mouth 2 (two) times daily. for 4 days  Dispense: 8 tablet; Refill: 0  -     benzonatate (TESSALON) 100 MG capsule; Take 1 capsule (100 mg total) by mouth 3 (three) times daily as needed for Cough.  Dispense: 30 capsule; Refill: 0  -     guaiFENesin-codeine 100-10 mg/5 ml (GUAIFENESIN AC)  mg/5 mL syrup; Take 5 mLs by mouth 3 (three) times daily as needed for Cough.  Dispense: 118 mL; Refill: 0

## 2025-02-19 DIAGNOSIS — Z12.31 OTHER SCREENING MAMMOGRAM: ICD-10-CM

## 2025-02-21 ENCOUNTER — OFFICE VISIT (OUTPATIENT)
Dept: FAMILY MEDICINE | Facility: CLINIC | Age: 65
End: 2025-02-21
Payer: COMMERCIAL

## 2025-02-21 VITALS
OXYGEN SATURATION: 98 % | WEIGHT: 147.69 LBS | SYSTOLIC BLOOD PRESSURE: 130 MMHG | RESPIRATION RATE: 16 BRPM | BODY MASS INDEX: 26.17 KG/M2 | TEMPERATURE: 98 F | HEART RATE: 87 BPM | DIASTOLIC BLOOD PRESSURE: 80 MMHG | HEIGHT: 63 IN

## 2025-02-21 DIAGNOSIS — R74.8 ELEVATED LIVER ENZYMES: ICD-10-CM

## 2025-02-21 DIAGNOSIS — E03.9 HYPOTHYROIDISM, UNSPECIFIED TYPE: ICD-10-CM

## 2025-02-21 DIAGNOSIS — Z12.11 COLON CANCER SCREENING: ICD-10-CM

## 2025-02-21 DIAGNOSIS — R79.89 ELEVATED PARATHYROID HORMONE: ICD-10-CM

## 2025-02-21 DIAGNOSIS — F32.A DEPRESSION, UNSPECIFIED DEPRESSION TYPE: ICD-10-CM

## 2025-02-21 DIAGNOSIS — E06.3 HASHIMOTO'S THYROIDITIS: ICD-10-CM

## 2025-02-21 DIAGNOSIS — Z12.31 ENCOUNTER FOR SCREENING MAMMOGRAM FOR MALIGNANT NEOPLASM OF BREAST: ICD-10-CM

## 2025-02-21 DIAGNOSIS — E78.5 HYPERLIPIDEMIA, UNSPECIFIED HYPERLIPIDEMIA TYPE: Primary | ICD-10-CM

## 2025-02-21 PROCEDURE — 3008F BODY MASS INDEX DOCD: CPT | Mod: CPTII,S$GLB,, | Performed by: FAMILY MEDICINE

## 2025-02-21 PROCEDURE — 99999 PR PBB SHADOW E&M-EST. PATIENT-LVL IV: CPT | Mod: PBBFAC,,, | Performed by: FAMILY MEDICINE

## 2025-02-21 PROCEDURE — 1159F MED LIST DOCD IN RCRD: CPT | Mod: CPTII,S$GLB,, | Performed by: FAMILY MEDICINE

## 2025-02-21 PROCEDURE — 3075F SYST BP GE 130 - 139MM HG: CPT | Mod: CPTII,S$GLB,, | Performed by: FAMILY MEDICINE

## 2025-02-21 PROCEDURE — 3079F DIAST BP 80-89 MM HG: CPT | Mod: CPTII,S$GLB,, | Performed by: FAMILY MEDICINE

## 2025-02-21 PROCEDURE — G2211 COMPLEX E/M VISIT ADD ON: HCPCS | Mod: S$GLB,,, | Performed by: FAMILY MEDICINE

## 2025-02-21 PROCEDURE — 99214 OFFICE O/P EST MOD 30 MIN: CPT | Mod: S$GLB,,, | Performed by: FAMILY MEDICINE

## 2025-02-21 PROCEDURE — 1160F RVW MEDS BY RX/DR IN RCRD: CPT | Mod: CPTII,S$GLB,, | Performed by: FAMILY MEDICINE

## 2025-02-21 RX ORDER — CITALOPRAM 40 MG/1
40 TABLET, FILM COATED ORAL DAILY
Qty: 90 TABLET | Refills: 3 | Status: SHIPPED | OUTPATIENT
Start: 2025-02-21

## 2025-02-21 NOTE — PROGRESS NOTES
Subjective:       Patient ID: Karen Muhammad is a 64 y.o. female.    Chief Complaint: Annual Exam    Problem List[1]  Patient is here for a chronic conditions follow up.    Reviewed labs 3/2024  History of Present Illness    CHIEF COMPLAINT:  Ms. Muhammad presents today for annual follow up    RECENT HEALTH:  She reports a viral infection a couple weeks ago with symptoms primarily affecting the head, denying fever.    MEDICATIONS:  She takes thyroid supplement 88 mcg, citalopram (attempted weaning but found it difficult), and Lipitor 10 mg.    MEDICAL HISTORY:  She has a history of elevated liver enzymes. Previous ultrasound showed benign liver cysts without fatty liver.    PREVENTIVE CARE:  Her last colonoscopy was in 2015. She had a mammogram the previous year and maintains biennial mammogram screening.      ROS:  ROS findings as noted in HPI.        Review of Systems   Constitutional:  Negative for fatigue and unexpected weight change.   Respiratory:  Negative for chest tightness and shortness of breath.    Cardiovascular:  Negative for chest pain, palpitations and leg swelling.   Gastrointestinal:  Negative for abdominal pain.   Musculoskeletal:  Negative for arthralgias.   Neurological:  Negative for dizziness, syncope, light-headedness and headaches.      Relevant History:  Typically declines vaccines    Hepatology elevated lfts. U/s abd showed hepatic cysts. Hep panel neg. On lipitor     Endocrine Dr. Beaulieu hypothyroid, elevated pth     Eye Dr. Sampson cataract      Card Dr. Nguyen stress test 2/22 neg for ischemia        Consult Plastic Dr. Summers C/o large breasts causing chronic strain on neck and shoulders. Feels it contributes to migraines.  Has deep grooves from bras. Gets rashes under both breast at least once a year.  Would like breast reduction  Left breast armpit mass -did mammo and u/s - benign 2020. Dr. Clark. Mammo 2021  neg. S/p breast reduction 4/22       Psych celexa working  well. Has had anger and irritability when tried to get off.        GYN dexa 2023 osteopenia         Neuro  [facility] -neuro for migraines.  occurs cyclically 2-3 a month. maxalt works .  Sometimes has known triggers     Derm Dr. Ecsalera treating lichen sclerosis in vulva. Doing better on current regimen    GI Dr. Galeana colonoscopy no polyps 2015   Objective:      Physical Exam  Vitals and nursing note reviewed.   Constitutional:       Appearance: She is well-developed.   Cardiovascular:      Rate and Rhythm: Normal rate and regular rhythm.      Heart sounds: Normal heart sounds.   Pulmonary:      Effort: Pulmonary effort is normal.      Breath sounds: Normal breath sounds.   Skin:     General: Skin is warm and dry.   Neurological:      Mental Status: She is alert and oriented to person, place, and time.         Assessment:       ICD-10-CM ICD-9-CM    1. Encounter for screening mammogram for malignant neoplasm of breast  Z12.31 V76.12       2. Colon cancer screening  Z12.11 V76.51 Case Request Endoscopy: COLONOSCOPY      3. Hyperlipidemia, unspecified hyperlipidemia type  E78.5 272.4       4. Hypothyroidism, unspecified type  E03.9 244.9       5. Hashimoto's thyroiditis  E06.3 245.2       6. Elevated parathyroid hormone  R79.89 259.9       7. Elevated liver enzymes  R74.8 790.5       8. Depression, unspecified depression type  F32.A 311 citalopram (CELEXA) 40 MG tablet         Plan:   1. Hyperlipidemia, unspecified hyperlipidemia type (Primary)  Screen and treat as indicated:      2. Encounter for screening mammogram for malignant neoplasm of breast  Screen and treat as indicated:      3. Colon cancer screening  Refer for screening  - Case Request Endoscopy: COLONOSCOPY    4. Hypothyroidism, unspecified type  Screen and treat as indicated:      5. Hashimoto's thyroiditis  Screen and treat as indicated:      6. Elevated parathyroid hormone  Screen and treat as indicated:      7. Elevated liver enzymes  Your liver  enzymes are slightly elevated.  Avoid liver irritants like tylenol and alcohol, eat a low fat diet and work toward an ideal body weight to help lower liver inflammation.   Screen and treat as indicated:      8. Depression, unspecified depression type  treat  - citalopram (CELEXA) 40 MG tablet; Take 1 tablet (40 mg total) by mouth once daily.  Dispense: 90 tablet; Refill: 3  Assessment & Plan    - Explained osteopenia as early bone loss that does not yet require medication.  - Discussed importance of calcium, vitamin D, vitamin K, and magnesium for bone health.  - Educated on exercises to promote bone health: walking for lower body, weight training for upper body.  - Informed about RSV vaccine eligibility for patients 60 and older, noting 95% immunity from 1 shot.  - Ms. Muhammad to take calcium supplement (e.g.  - Caltrate) daily if not getting enough through diet.  - Ms. Muhammad to perform strength training exercises 2 times weekly.  - Ms. Muhammad to walk 30 minutes a day, 5 days a week.  - Continued thyroid supplement at 88 mcg.  - Continued Lipitor 10 mg.  - Continued citalopram.  - Refilled for 1-year supply, to be sent to [facility] pharmacy.  - [facility] lab work ordered to check lipids (pending confirmation of recent results from endocrinologist).  - Referred to Dr. العراقي for colonoscopy.  - Follow up when GI department contacts patient to schedule colonoscopy.  - Follow up with endocrinologist Dr. Calvo for thyroid management and potential bone density scan.  - Follow up in 1 year to consider bone density scan.         Time spent with patient: 20 minutes  Patient with be reevaluated in 1 year or sooner prn  Greater than 50% of this visit was spent counseling as described in above documentation:Yes  This note was generated with the assistance of ambient listening technology. Verbal consent was obtained by the patient and accompanying visitor(s) for the recording of patient appointment to facilitate this note.  I attest to having reviewed and edited the generated note for accuracy, though some syntax or spelling errors may persist. Please contact the author of this note for any clarification.            [1]   Patient Active Problem List  Diagnosis    Hyperlipidemia    Migraine    Hashimoto's thyroiditis    Anxiety    Depression    Rectal bleeding    Post herpetic neuralgia    Hypercalcemia    Elevated parathyroid hormone    Mixed hyperlipidemia    History of migraine    Chest pain    Gastroesophageal reflux disease    Current mild episode of major depressive disorder without prior episode

## 2025-02-21 NOTE — Clinical Note
Please get labs from [facility] 2024 and 2025 Dr. Beaulieu endocrine for lipids review  Dr. Narinder Limon

## 2025-03-05 ENCOUNTER — TELEPHONE (OUTPATIENT)
Dept: FAMILY MEDICINE | Facility: CLINIC | Age: 65
End: 2025-03-05
Payer: COMMERCIAL

## 2025-03-05 NOTE — TELEPHONE ENCOUNTER
----- Message from Cristina Barcenas MD sent at 2/26/2025  8:19 AM CST -----  Please get labs from [facility] 2024 and 2025 Dr. Beaulieu endocrine for lipids review    [facility] Barcenas

## 2025-04-13 ENCOUNTER — PATIENT OUTREACH (OUTPATIENT)
Dept: ADMINISTRATIVE | Facility: HOSPITAL | Age: 65
End: 2025-04-13
Payer: COMMERCIAL

## 2025-04-14 ENCOUNTER — RESULTS FOLLOW-UP (OUTPATIENT)
Dept: FAMILY MEDICINE | Facility: CLINIC | Age: 65
End: 2025-04-14

## 2025-05-13 ENCOUNTER — PATIENT MESSAGE (OUTPATIENT)
Dept: GASTROENTEROLOGY | Facility: CLINIC | Age: 65
End: 2025-05-13
Payer: COMMERCIAL

## 2025-06-18 ENCOUNTER — OFFICE VISIT (OUTPATIENT)
Dept: URGENT CARE | Facility: CLINIC | Age: 65
End: 2025-06-18
Payer: COMMERCIAL

## 2025-06-18 VITALS
DIASTOLIC BLOOD PRESSURE: 79 MMHG | TEMPERATURE: 99 F | WEIGHT: 140 LBS | RESPIRATION RATE: 17 BRPM | HEIGHT: 63 IN | BODY MASS INDEX: 24.8 KG/M2 | HEART RATE: 86 BPM | SYSTOLIC BLOOD PRESSURE: 135 MMHG | OXYGEN SATURATION: 97 %

## 2025-06-18 DIAGNOSIS — R05.9 COUGH, UNSPECIFIED TYPE: Primary | ICD-10-CM

## 2025-06-18 DIAGNOSIS — J06.9 VIRAL URI WITH COUGH: ICD-10-CM

## 2025-06-18 LAB
CTP QC/QA: YES
CTP QC/QA: YES
FLUAV AG NPH QL: NEGATIVE
FLUBV AG NPH QL: NEGATIVE
SARS-COV+SARS-COV-2 AG RESP QL IA.RAPID: NEGATIVE

## 2025-06-18 PROCEDURE — 87811 SARS-COV-2 COVID19 W/OPTIC: CPT | Mod: QW,S$GLB,, | Performed by: NURSE PRACTITIONER

## 2025-06-18 PROCEDURE — 87804 INFLUENZA ASSAY W/OPTIC: CPT | Mod: QW,,, | Performed by: NURSE PRACTITIONER

## 2025-06-18 PROCEDURE — 99214 OFFICE O/P EST MOD 30 MIN: CPT | Mod: S$GLB,,, | Performed by: NURSE PRACTITIONER

## 2025-06-18 RX ORDER — PROMETHAZINE HYDROCHLORIDE AND DEXTROMETHORPHAN HYDROBROMIDE 6.25; 15 MG/5ML; MG/5ML
5 SYRUP ORAL NIGHTLY PRN
Qty: 118 ML | Refills: 0 | Status: SHIPPED | OUTPATIENT
Start: 2025-06-18

## 2025-06-18 RX ORDER — FLUTICASONE PROPIONATE 50 MCG
1 SPRAY, SUSPENSION (ML) NASAL DAILY
Qty: 15.8 ML | Refills: 0 | Status: SHIPPED | OUTPATIENT
Start: 2025-06-18

## 2025-06-18 RX ORDER — BENZONATATE 100 MG/1
100 CAPSULE ORAL 3 TIMES DAILY PRN
Qty: 30 CAPSULE | Refills: 0 | Status: SHIPPED | OUTPATIENT
Start: 2025-06-18 | End: 2025-06-28

## 2025-06-18 RX ORDER — AZELASTINE 1 MG/ML
1 SPRAY, METERED NASAL 2 TIMES DAILY
Qty: 30 ML | Refills: 0 | Status: SHIPPED | OUTPATIENT
Start: 2025-06-18

## 2025-06-18 NOTE — PROGRESS NOTES
"Subjective:      Patient ID: Karen Muhammad is a 65 y.o. female.    Vitals:  height is 5' 3" (1.6 m) and weight is 63.5 kg (140 lb). Her oral temperature is 98.6 °F (37 °C). Her blood pressure is 135/79 and her pulse is 86. Her respiration is 17 and oxygen saturation is 97%.     Chief Complaint: URI    URI   This is a new problem. The current episode started in the past 7 days (x1.5 days). The problem has been unchanged. There has been no fever. The cough is Non-productive. Associated symptoms include congestion, coughing, headaches (With cough), sinus pain and a sore throat. Pertinent negatives include no diarrhea, ear pain, nausea, rash, vomiting or wheezing. She has tried antihistamine and decongestant for the symptoms. The treatment provided no relief.       Constitution: Positive for fatigue (Tired, not severe). Negative for appetite change, chills, sweating and fever.   HENT:  Positive for congestion, postnasal drip, sinus pain, sinus pressure and sore throat. Negative for ear pain, ear discharge, drooling, trouble swallowing and voice change.    Respiratory:  Positive for cough. Negative for chest tightness, shortness of breath, wheezing and asthma.    Gastrointestinal:  Negative for nausea, vomiting and diarrhea.   Musculoskeletal:  Negative for muscle ache.   Skin:  Negative for rash.   Allergic/Immunologic: Negative for asthma.   Neurological:  Positive for headaches (With cough).      Objective:     Physical Exam   Constitutional: She is oriented to person, place, and time. She is cooperative.  Non-toxic appearance. She does not appear ill. No distress. awake  HENT:   Head: Normocephalic and atraumatic.   Ears:   Right Ear: Tympanic membrane, external ear and ear canal normal.   Left Ear: Tympanic membrane, external ear and ear canal normal.   Nose: Rhinorrhea and congestion present.   Mouth/Throat: Mucous membranes are moist. No oropharyngeal exudate or posterior oropharyngeal erythema.   Eyes: " Conjunctivae are normal. Right eye exhibits no discharge. Left eye exhibits no discharge.   Cardiovascular: Normal rate, regular rhythm and normal heart sounds.   Pulmonary/Chest: Effort normal and breath sounds normal. No accessory muscle usage. No tachypnea. No respiratory distress. She has no wheezes. She has no rhonchi. She has no rales. She exhibits no tenderness.   Abdominal: Normal appearance.   Neurological: no focal deficit. She is alert and oriented to person, place, and time. No sensory deficit.   Skin: Skin is warm, dry, not diaphoretic and no rash. Capillary refill takes 2 to 3 seconds.   Psychiatric: Her behavior is normal. Mood normal.   Nursing note and vitals reviewed.      Assessment:     1. Cough, unspecified type    2. Viral URI with cough      COVID negative  Flu a/B negative    Plan:       Cough, unspecified type  -     POCT Influenza A/B Rapid Antigen  -     SARS Coronavirus 2 Antigen, POCT Manual Read    Viral URI with cough  -     azelastine (ASTELIN) 137 mcg (0.1 %) nasal spray; 1 spray (137 mcg total) by Nasal route 2 (two) times daily.  Dispense: 30 mL; Refill: 0  -     fluticasone propionate (FLONASE) 50 mcg/actuation nasal spray; 1 spray (50 mcg total) by Each Nostril route once daily.  Dispense: 15.8 mL; Refill: 0  -     promethazine-dextromethorphan (PROMETHAZINE-DM) 6.25-15 mg/5 mL Syrp; Take 5 mLs by mouth nightly as needed (cough).  Dispense: 118 mL; Refill: 0  -     benzonatate (TESSALON) 100 MG capsule; Take 1 capsule (100 mg total) by mouth 3 (three) times daily as needed for Cough.  Dispense: 30 capsule; Refill: 0                    The patient appears to have a viral upper respiratory infection.  Based upon the history and physical exam the patient does not appear to have  pneumonia,  otitis media, bacterial sinusitis, strep pharyngitis, retropharyngeal or peritonsillar abscess, meningitis/encephalitis, sepsis,.  Patient appears well, non-toxic.  Patient does not appear to  need antibiotics at this time. Outpatient management of symptoms are appropriate at this time, recommended over the counter medications in conjunction with prescribed supported medications with specific return precautions, close f/u with PCP or ER for emergent concerns.   Patient VU and is in agreement with plan of care

## 2025-06-18 NOTE — PATIENT INSTRUCTIONS
A negative test result does not rule out infection with covid.  Repeat testing at home or at any facility that tests for covid is recommended.  Current CDC recommendation is to test a minimum of one more time at least 48 hours from initial negative test.       If mucus/sputum is thick and hard to expectorate, use Guaifenesin 600-1200 mg twice a day over-the-counter for 10 days.  Mucinex blue box or generic equivalent  If mucus if thin with excessive clear, runny nose, you can try mucinex- D which must be purchased from behind the pharmacy counter as take as prescribed on the package. Do not use longer than 4-5 days.  You can also utilize over the counter Afrin or generic equivalent nasal spray to open nasal passages for breathing, however do not use longer than 3-4 days.    The following allergy medications are recommended, not required:  However they do help minimize symptoms now but will reduce lingering symptoms such as plugged/congested ears.   Zyrtec or antihistamine of choice over-the-counter daily until symptoms have resolved  Flonase or nasal steroid of choice over-the-counter daily until symptoms have resolved  Astelin nasal antihistamine as prescribed for faster relief of nasal/sinus inflammation.  If insurance does not cover, you can purchase over the counter as Astepro much cheaper.    The following cough medications have been prescribed to you, however most insurances do not cover them so be certain to price check these items before they ring you up as you may find something off the shelf or already at home that may be sufficient.    Tessalon Perles cough pills that help with cough caused by the postnasal drip, throat irritation.  Best for use during daytime hours   Phenergan cough syrup at night only to suppress cough so that you are able to rest.  You can take this together with Tessalon Perles for added benefit    If you have been told this is a viral illness, you may require re-evaluation if  symptoms improve but then worsen soon after or you have note changes in characteristic of mucus/sputum developing  into thick cloudy (not clear), colored change or blood-tinged.    ER for any significant worsening of respiratory status such as difficulty breathing, shortness of breath not relieved by rest, air hunger, excessive fatigue/lethargy, upper airway swelling making it hard to swallow.

## 2025-06-22 ENCOUNTER — OFFICE VISIT (OUTPATIENT)
Dept: URGENT CARE | Facility: CLINIC | Age: 65
End: 2025-06-22
Payer: COMMERCIAL

## 2025-06-22 VITALS
BODY MASS INDEX: 24.8 KG/M2 | TEMPERATURE: 99 F | HEIGHT: 63 IN | WEIGHT: 140 LBS | DIASTOLIC BLOOD PRESSURE: 82 MMHG | OXYGEN SATURATION: 97 % | RESPIRATION RATE: 16 BRPM | HEART RATE: 90 BPM | SYSTOLIC BLOOD PRESSURE: 123 MMHG

## 2025-06-22 DIAGNOSIS — B96.89 BACTERIAL SINUSITIS: ICD-10-CM

## 2025-06-22 DIAGNOSIS — R05.9 COUGH, UNSPECIFIED TYPE: Primary | ICD-10-CM

## 2025-06-22 DIAGNOSIS — J06.9 BACTERIAL URI: ICD-10-CM

## 2025-06-22 DIAGNOSIS — J32.9 BACTERIAL SINUSITIS: ICD-10-CM

## 2025-06-22 DIAGNOSIS — B96.89 BACTERIAL URI: ICD-10-CM

## 2025-06-22 PROCEDURE — 87811 SARS-COV-2 COVID19 W/OPTIC: CPT | Mod: QW,S$GLB,, | Performed by: NURSE PRACTITIONER

## 2025-06-22 PROCEDURE — 87804 INFLUENZA ASSAY W/OPTIC: CPT | Mod: QW,,, | Performed by: NURSE PRACTITIONER

## 2025-06-22 PROCEDURE — 99214 OFFICE O/P EST MOD 30 MIN: CPT | Mod: 25,S$GLB,, | Performed by: NURSE PRACTITIONER

## 2025-06-22 PROCEDURE — 96372 THER/PROPH/DIAG INJ SC/IM: CPT | Mod: S$GLB,,, | Performed by: NURSE PRACTITIONER

## 2025-06-22 RX ORDER — AMOXICILLIN AND CLAVULANATE POTASSIUM 875; 125 MG/1; MG/1
1 TABLET, FILM COATED ORAL 2 TIMES DAILY
Qty: 20 TABLET | Refills: 0 | Status: SHIPPED | OUTPATIENT
Start: 2025-06-22 | End: 2025-07-02

## 2025-06-22 RX ORDER — BROMPHENIRAMINE MALEATE, PSEUDOEPHEDRINE HYDROCHLORIDE, AND DEXTROMETHORPHAN HYDROBROMIDE 2; 30; 10 MG/5ML; MG/5ML; MG/5ML
5-10 SYRUP ORAL EVERY 6 HOURS PRN
Qty: 118 ML | Refills: 0 | Status: SHIPPED | OUTPATIENT
Start: 2025-06-22 | End: 2025-07-02

## 2025-06-22 RX ORDER — DEXAMETHASONE SODIUM PHOSPHATE 4 MG/ML
4 INJECTION, SOLUTION INTRA-ARTICULAR; INTRALESIONAL; INTRAMUSCULAR; INTRAVENOUS; SOFT TISSUE
Status: COMPLETED | OUTPATIENT
Start: 2025-06-22 | End: 2025-06-22

## 2025-06-22 RX ADMIN — DEXAMETHASONE SODIUM PHOSPHATE 4 MG: 4 INJECTION, SOLUTION INTRA-ARTICULAR; INTRALESIONAL; INTRAMUSCULAR; INTRAVENOUS; SOFT TISSUE at 09:06

## 2025-06-22 NOTE — PROGRESS NOTES
"Subjective:      Patient ID: Karen Muhammad is a 65 y.o. female.    Vitals:  height is 5' 3" (1.6 m) and weight is 63.5 kg (140 lb). Her oral temperature is 98.5 °F (36.9 °C). Her blood pressure is 123/82 and her pulse is 90. Her respiration is 16 and oxygen saturation is 97%.     Chief Complaint: Cough    Pt states "she was seen on 06/18/2025. She is still having a cough and she had yellow mucus in her eyes this morning."    Cough  Associated symptoms include headaches. Pertinent negatives include no fever or rash.     Constitution: Negative for fever.   HENT:  Positive for sinus pain and sinus pressure.    Respiratory:  Positive for cough.    Skin:  Negative for rash.   Neurological:  Positive for headaches.      Objective:     Physical Exam   Constitutional: She is oriented to person, place, and time.   HENT:   Head: Normocephalic.   Ears:   Right Ear: Tympanic membrane normal.   Left Ear: Tympanic membrane and ear canal normal.   Mouth/Throat: Posterior oropharyngeal erythema present. No oropharyngeal exudate.   Eyes: Conjunctivae are normal.   Cardiovascular: Normal rate.   Pulmonary/Chest: Effort normal and breath sounds normal.   Abdominal: Normal appearance.   Musculoskeletal: Normal range of motion.         General: Normal range of motion.   Neurological: She is alert and oriented to person, place, and time.   Psychiatric: Her behavior is normal. Mood, judgment and thought content normal.   Nursing note and vitals reviewed.      Assessment:     1. Cough, unspecified type    2. Bacterial sinusitis    3. Bacterial URI        Plan:       Cough, unspecified type  -     SARS Coronavirus 2 Antigen, POCT Manual Read  -     POCT Influenza A/B Rapid Antigen    Bacterial sinusitis  -     brompheniramine-pseudoeph-DM (BROMFED DM) 2-30-10 mg/5 mL Syrp; Take 5-10 mLs by mouth every 6 (six) hours as needed (cough).  Dispense: 118 mL; Refill: 0    Bacterial URI    Other orders  -     dexAMETHasone injection 4 mg  -  "    amoxicillin-clavulanate 875-125mg (AUGMENTIN) 875-125 mg per tablet; Take 1 tablet by mouth 2 (two) times daily. for 10 days  Dispense: 20 tablet; Refill: 0                Pt presents approx after 7 days of symptoms of sinus pressue/pain cough and HA with newer crusting in eyes. Pt states also worsening including purulent productive rhinorrhea. Pt seen approx 4 days prior, screened and (-) for covid and flu and also home retested day after visit. Exam benign, VSS. Re screened for Covid and negative. Due to duration with treat for bacterial URI/sinusitis. Return precautions discussed.

## 2025-08-12 ENCOUNTER — OFFICE VISIT (OUTPATIENT)
Dept: URGENT CARE | Facility: CLINIC | Age: 65
End: 2025-08-12
Payer: COMMERCIAL

## 2025-08-12 VITALS
HEART RATE: 89 BPM | WEIGHT: 140 LBS | HEIGHT: 63 IN | BODY MASS INDEX: 24.8 KG/M2 | SYSTOLIC BLOOD PRESSURE: 147 MMHG | DIASTOLIC BLOOD PRESSURE: 89 MMHG | OXYGEN SATURATION: 99 % | TEMPERATURE: 99 F | RESPIRATION RATE: 19 BRPM

## 2025-08-12 DIAGNOSIS — U07.1 COVID-19: Primary | ICD-10-CM

## 2025-08-12 DIAGNOSIS — R05.9 COUGH, UNSPECIFIED TYPE: ICD-10-CM

## 2025-08-12 LAB
CTP QC/QA: YES
CTP QC/QA: YES
FLUAV AG NPH QL: NEGATIVE
FLUBV AG NPH QL: NEGATIVE
SARS-COV+SARS-COV-2 AG RESP QL IA.RAPID: POSITIVE

## 2025-08-12 PROCEDURE — 87804 INFLUENZA ASSAY W/OPTIC: CPT | Mod: QW,,,

## 2025-08-12 PROCEDURE — 99214 OFFICE O/P EST MOD 30 MIN: CPT | Mod: S$GLB,,,

## 2025-08-12 PROCEDURE — 87811 SARS-COV-2 COVID19 W/OPTIC: CPT | Mod: QW,S$GLB,,

## 2025-08-12 RX ORDER — PROMETHAZINE HYDROCHLORIDE AND DEXTROMETHORPHAN HYDROBROMIDE 6.25; 15 MG/5ML; MG/5ML
5 SYRUP ORAL NIGHTLY PRN
Qty: 50 ML | Refills: 0 | Status: SHIPPED | OUTPATIENT
Start: 2025-08-12 | End: 2025-08-22

## 2025-08-18 ENCOUNTER — TELEPHONE (OUTPATIENT)
Dept: URGENT CARE | Facility: CLINIC | Age: 65
End: 2025-08-18
Payer: COMMERCIAL